# Patient Record
Sex: FEMALE | Race: WHITE | NOT HISPANIC OR LATINO | Employment: FULL TIME | URBAN - METROPOLITAN AREA
[De-identification: names, ages, dates, MRNs, and addresses within clinical notes are randomized per-mention and may not be internally consistent; named-entity substitution may affect disease eponyms.]

---

## 2021-07-14 ENCOUNTER — TRANSFERRED RECORDS (OUTPATIENT)
Dept: HEALTH INFORMATION MANAGEMENT | Facility: CLINIC | Age: 36
End: 2021-07-14
Payer: COMMERCIAL

## 2021-07-14 LAB
HPV ABSTRACT: NORMAL
PAP-ABSTRACT: ABNORMAL
PHQ9 SCORE: 0

## 2021-12-01 LAB
ABO (EXTERNAL): NORMAL
C TRACH DNA SPEC QL PROBE+SIG AMP: NEGATIVE
HEMATOCRIT (EXTERNAL): 37.4 % (ref 33–51)
HEMOGLOBIN (EXTERNAL): 12.9 G/DL (ref 12–16)
HEPATITIS B SURFACE ANTIGEN (EXTERNAL): NONREACTIVE
HEPATITIS C ANTIBODY (EXTERNAL): NEGATIVE
HIV1+2 AB SERPL QL IA: NONREACTIVE
N GONORRHOEA DNA SPEC QL PROBE+SIG AMP: NEGATIVE
PLATELET COUNT (EXTERNAL): 280 10E3/UL (ref 140–440)
RH (EXTERNAL): NEGATIVE
RUBELLA ANTIBODY IGG (EXTERNAL): NORMAL
SPECIMEN DESCRIP: NORMAL
SPECIMEN DESCRIPTION: NORMAL
TREPONEMA PALLIDUM ANTIBODY (EXTERNAL): NEGATIVE
URINE CULTURE OB NURSE INTERPRETATION (EXTERNAL RESULT): NEGATIVE
VDRL (SYPHILIS) (EXTERNAL): NEGATIVE

## 2021-12-13 ENCOUNTER — TELEPHONE (OUTPATIENT)
Dept: FAMILY MEDICINE | Facility: CLINIC | Age: 36
End: 2021-12-13

## 2021-12-13 NOTE — TELEPHONE ENCOUNTER
Reason for Call:  Other ob transfer    Detailed comments: Pt is having a change in insurance and has to transfer care. Would like to see Dr. Alegria.  Is currently 13wks, has one more appt at [facility] with Yvonne Lane on 12/30/21    Phone Number Patient can be reached at: Cell number on file:    Telephone Information:   Mobile [phone]       Best Time: anytime    Can we leave a detailed message on this number? YES    Call taken on 12/13/2021 at 11:49 AM by Sandor Murdock

## 2021-12-31 ENCOUNTER — TRANSFERRED RECORDS (OUTPATIENT)
Dept: HEALTH INFORMATION MANAGEMENT | Facility: CLINIC | Age: 36
End: 2021-12-31
Payer: COMMERCIAL

## 2022-01-25 ENCOUNTER — PRENATAL OFFICE VISIT (OUTPATIENT)
Dept: FAMILY MEDICINE | Facility: CLINIC | Age: 37
End: 2022-01-25
Payer: COMMERCIAL

## 2022-01-25 DIAGNOSIS — O09.92 HIGH-RISK PREGNANCY IN SECOND TRIMESTER: Primary | ICD-10-CM

## 2022-01-25 PROBLEM — R87.610 ASCUS OF CERVIX WITH NEGATIVE HIGH RISK HPV: Status: ACTIVE | Noted: 2021-07-14

## 2022-01-25 PROCEDURE — 99203 OFFICE O/P NEW LOW 30 MIN: CPT | Performed by: FAMILY MEDICINE

## 2022-01-25 PROCEDURE — 99207 PR PRENATAL VISIT: CPT | Performed by: FAMILY MEDICINE

## 2022-01-25 RX ORDER — PRENATAL WITH FERROUS FUM AND FOLIC ACID 3080; 920; 120; 400; 22; 1.84; 3; 20; 10; 1; 12; 200; 27; 25; 2 [IU]/1; [IU]/1; MG/1; [IU]/1; MG/1; MG/1; MG/1; MG/1; MG/1; MG/1; UG/1; MG/1; MG/1; MG/1; MG/1
1 TABLET ORAL DAILY
COMMUNITY
Start: 2022-01-09 | End: 2022-08-03

## 2022-01-25 ASSESSMENT — MIFFLIN-ST. JEOR: SCORE: 1688.68
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
## 2022-01-27 VITALS
OXYGEN SATURATION: 97 % | HEART RATE: 66 BPM | DIASTOLIC BLOOD PRESSURE: 58 MMHG | SYSTOLIC BLOOD PRESSURE: 106 MMHG | WEIGHT: 211 LBS | HEIGHT: 67 IN | BODY MASS INDEX: 33.12 KG/M2

## 2022-02-01 ENCOUNTER — HOSPITAL ENCOUNTER (OUTPATIENT)
Dept: ULTRASOUND IMAGING | Facility: CLINIC | Age: 37
Discharge: HOME | End: 2022-02-01
Attending: FAMILY MEDICINE | Admitting: FAMILY MEDICINE
Payer: COMMERCIAL

## 2022-02-01 DIAGNOSIS — O09.92 HIGH-RISK PREGNANCY IN SECOND TRIMESTER: ICD-10-CM

## 2022-02-01 PROCEDURE — 76805 OB US >/= 14 WKS SNGL FETUS: CPT

## 2022-02-06 ENCOUNTER — HEALTH MAINTENANCE LETTER (OUTPATIENT)
Age: 37
End: 2022-02-06

## 2022-03-02 ENCOUNTER — PRENATAL OFFICE VISIT (OUTPATIENT)
Dept: FAMILY MEDICINE | Facility: CLINIC | Age: 37
End: 2022-03-02
Payer: COMMERCIAL

## 2022-03-02 VITALS
WEIGHT: 212 LBS | DIASTOLIC BLOOD PRESSURE: 62 MMHG | BODY MASS INDEX: 32.96 KG/M2 | OXYGEN SATURATION: 98 % | SYSTOLIC BLOOD PRESSURE: 110 MMHG | HEART RATE: 84 BPM

## 2022-03-02 DIAGNOSIS — L70.9 ACNE, UNSPECIFIED ACNE TYPE: Primary | ICD-10-CM

## 2022-03-02 PROCEDURE — 99207 PR PRENATAL VISIT: CPT | Performed by: FAMILY MEDICINE

## 2022-03-02 RX ORDER — CLINDAMYCIN PHOSPHATE 11.9 MG/ML
SOLUTION TOPICAL 2 TIMES DAILY
Qty: 30 ML | Refills: 3 | Status: SHIPPED | OUTPATIENT
Start: 2022-03-02

## 2022-03-02 ASSESSMENT — PATIENT HEALTH QUESTIONNAIRE - PHQ9
SUM OF ALL RESPONSES TO PHQ QUESTIONS 1-9: 0
SUM OF ALL RESPONSES TO PHQ QUESTIONS 1-9: 0
10. IF YOU CHECKED OFF ANY PROBLEMS, HOW DIFFICULT HAVE THESE PROBLEMS MADE IT FOR YOU TO DO YOUR WORK, TAKE CARE OF THINGS AT HOME, OR GET ALONG WITH OTHER PEOPLE: NOT DIFFICULT AT ALL

## 2022-03-02 NOTE — PROGRESS NOTES
Doing well.  No concerns today.  Prenatal flowsheet information is reviewed.  Discussed kick counts and fetal movement.  Reportable signs and symptoms discussed.    She has had some mild swelling, has been wearing compression socks which is helping. Baby is moving well as well.   Plan for one hour GTT, labs, tdap, rhogam in four weeks.       Answers for HPI/ROS submitted by the patient on 3/2/2022  If you checked off any problems, how difficult have these problems made it for you to do your work, take care of things at home, or get along with other people?: Not difficult at all  PHQ9 TOTAL SCORE: 0

## 2022-03-03 ASSESSMENT — PATIENT HEALTH QUESTIONNAIRE - PHQ9: SUM OF ALL RESPONSES TO PHQ QUESTIONS 1-9: 0

## 2022-03-30 ENCOUNTER — PRENATAL OFFICE VISIT (OUTPATIENT)
Dept: FAMILY MEDICINE | Facility: CLINIC | Age: 37
End: 2022-03-30
Payer: COMMERCIAL

## 2022-03-30 VITALS
OXYGEN SATURATION: 99 % | WEIGHT: 215 LBS | BODY MASS INDEX: 33.42 KG/M2 | DIASTOLIC BLOOD PRESSURE: 58 MMHG | HEART RATE: 78 BPM | SYSTOLIC BLOOD PRESSURE: 102 MMHG

## 2022-03-30 DIAGNOSIS — Z23 IMMUNIZATION DUE: ICD-10-CM

## 2022-03-30 DIAGNOSIS — Z67.91 RH NEGATIVE STATUS DURING PREGNANCY IN THIRD TRIMESTER: ICD-10-CM

## 2022-03-30 DIAGNOSIS — O09.93 HIGH-RISK PREGNANCY IN THIRD TRIMESTER: Primary | ICD-10-CM

## 2022-03-30 DIAGNOSIS — O26.893 RH NEGATIVE STATUS DURING PREGNANCY IN THIRD TRIMESTER: ICD-10-CM

## 2022-03-30 LAB
BASOPHILS # BLD AUTO: 0 10E3/UL (ref 0–0.2)
BASOPHILS NFR BLD AUTO: 0 %
EOSINOPHIL # BLD AUTO: 0.1 10E3/UL (ref 0–0.7)
EOSINOPHIL NFR BLD AUTO: 1 %
ERYTHROCYTE [DISTWIDTH] IN BLOOD BY AUTOMATED COUNT: 13.4 % (ref 10–15)
GLUCOSE 1H P 50 G GLC PO SERPL-MCNC: 132 MG/DL (ref 70–129)
HCT VFR BLD AUTO: 33.5 % (ref 35–47)
HGB BLD-MCNC: 11.5 G/DL (ref 11.7–15.7)
IMM GRANULOCYTES # BLD: 0.1 10E3/UL
IMM GRANULOCYTES NFR BLD: 1 %
LYMPHOCYTES # BLD AUTO: 2.3 10E3/UL (ref 0.8–5.3)
LYMPHOCYTES NFR BLD AUTO: 22 %
MCH RBC QN AUTO: 30.3 PG (ref 26.5–33)
MCHC RBC AUTO-ENTMCNC: 34.3 G/DL (ref 31.5–36.5)
MCV RBC AUTO: 88 FL (ref 78–100)
MONOCYTES # BLD AUTO: 0.6 10E3/UL (ref 0–1.3)
MONOCYTES NFR BLD AUTO: 6 %
NEUTROPHILS # BLD AUTO: 7.3 10E3/UL (ref 1.6–8.3)
NEUTROPHILS NFR BLD AUTO: 71 %
PLATELET # BLD AUTO: 233 10E3/UL (ref 150–450)
RBC # BLD AUTO: 3.8 10E6/UL (ref 3.8–5.2)
WBC # BLD AUTO: 10.3 10E3/UL (ref 4–11)

## 2022-03-30 PROCEDURE — 82950 GLUCOSE TEST: CPT | Performed by: FAMILY MEDICINE

## 2022-03-30 PROCEDURE — 86850 RBC ANTIBODY SCREEN: CPT | Performed by: FAMILY MEDICINE

## 2022-03-30 PROCEDURE — 86780 TREPONEMA PALLIDUM: CPT | Performed by: FAMILY MEDICINE

## 2022-03-30 PROCEDURE — 96372 THER/PROPH/DIAG INJ SC/IM: CPT | Performed by: FAMILY MEDICINE

## 2022-03-30 PROCEDURE — 90715 TDAP VACCINE 7 YRS/> IM: CPT | Performed by: FAMILY MEDICINE

## 2022-03-30 PROCEDURE — 90471 IMMUNIZATION ADMIN: CPT | Performed by: FAMILY MEDICINE

## 2022-03-30 PROCEDURE — 36415 COLL VENOUS BLD VENIPUNCTURE: CPT | Performed by: FAMILY MEDICINE

## 2022-03-30 PROCEDURE — 85025 COMPLETE CBC W/AUTO DIFF WBC: CPT | Performed by: FAMILY MEDICINE

## 2022-03-30 PROCEDURE — 99207 PR PRENATAL VISIT: CPT | Performed by: FAMILY MEDICINE

## 2022-03-30 NOTE — PROGRESS NOTES
Concerns: None. Baby is moving well.   No vaginal bleeding, loss of fluid, or contractions  1 hour GTT done today.  Rhogam given today.  Discussed kick counts and fetal movement.  Tdap given today  Discussed kick counts and fetal movement.  Discussed PTL, PROM, and when to call or come in.  RTC in 4 weeks.    She has had no headaches, blurry vision, ruq pain or swelling. No other concerns.       Yvonne Alegria MD

## 2022-03-31 DIAGNOSIS — O09.93 HIGH-RISK PREGNANCY IN THIRD TRIMESTER: Primary | ICD-10-CM

## 2022-03-31 LAB
ANTIBODY SCREEN, TUBE: NORMAL
SPECIMEN EXPIRATION DATE: NORMAL
T PALLIDUM AB SER QL: NONREACTIVE

## 2022-04-05 ENCOUNTER — LAB (OUTPATIENT)
Dept: LAB | Facility: CLINIC | Age: 37
End: 2022-04-05
Payer: COMMERCIAL

## 2022-04-05 DIAGNOSIS — O09.93 HIGH-RISK PREGNANCY IN THIRD TRIMESTER: ICD-10-CM

## 2022-04-05 LAB
GESTATIONAL GTT 1 HR POST DOSE: 159 MG/DL (ref 60–179)
GESTATIONAL GTT 2 HR POST DOSE: 149 MG/DL (ref 60–154)
GESTATIONAL GTT 3 HR POST DOSE: 87 MG/DL (ref 60–139)
GLUCOSE P FAST SERPL-MCNC: 70 MG/DL (ref 60–94)

## 2022-04-05 PROCEDURE — 82951 GLUCOSE TOLERANCE TEST (GTT): CPT

## 2022-04-05 PROCEDURE — 82952 GTT-ADDED SAMPLES: CPT

## 2022-04-05 PROCEDURE — 36415 COLL VENOUS BLD VENIPUNCTURE: CPT

## 2022-05-10 ENCOUNTER — PRENATAL OFFICE VISIT (OUTPATIENT)
Dept: FAMILY MEDICINE | Facility: CLINIC | Age: 37
End: 2022-05-10
Payer: COMMERCIAL

## 2022-05-10 VITALS
DIASTOLIC BLOOD PRESSURE: 58 MMHG | HEART RATE: 73 BPM | WEIGHT: 223 LBS | SYSTOLIC BLOOD PRESSURE: 100 MMHG | BODY MASS INDEX: 34.67 KG/M2 | OXYGEN SATURATION: 98 %

## 2022-05-10 DIAGNOSIS — N89.8 VAGINAL DISCHARGE: Primary | ICD-10-CM

## 2022-05-10 LAB
CLUE CELLS: ABNORMAL
TRICHOMONAS, WET PREP: ABNORMAL
WBC'S/HIGH POWER FIELD, WET PREP: ABNORMAL
YEAST, WET PREP: PRESENT

## 2022-05-10 PROCEDURE — 87210 SMEAR WET MOUNT SALINE/INK: CPT | Performed by: FAMILY MEDICINE

## 2022-05-10 PROCEDURE — 99207 PR PRENATAL VISIT: CPT | Performed by: FAMILY MEDICINE

## 2022-05-10 RX ORDER — MICONAZOLE NITRATE 2 %
1 CREAM WITH APPLICATOR VAGINAL AT BEDTIME
Qty: 45 G | Refills: 0 | Status: SHIPPED | OUTPATIENT
Start: 2022-05-10 | End: 2022-05-17

## 2022-05-23 ENCOUNTER — PRENATAL OFFICE VISIT (OUTPATIENT)
Dept: FAMILY MEDICINE | Facility: CLINIC | Age: 37
End: 2022-05-23
Payer: COMMERCIAL

## 2022-05-23 VITALS
WEIGHT: 223 LBS | DIASTOLIC BLOOD PRESSURE: 60 MMHG | OXYGEN SATURATION: 97 % | SYSTOLIC BLOOD PRESSURE: 96 MMHG | BODY MASS INDEX: 34.67 KG/M2 | HEART RATE: 83 BPM

## 2022-05-23 DIAGNOSIS — O09.93 HIGH-RISK PREGNANCY IN THIRD TRIMESTER: Primary | ICD-10-CM

## 2022-05-23 LAB
ALBUMIN UR-MCNC: NEGATIVE MG/DL
APPEARANCE UR: CLEAR
BILIRUB UR QL STRIP: NEGATIVE
COLOR UR AUTO: YELLOW
GLUCOSE UR STRIP-MCNC: NEGATIVE MG/DL
HGB UR QL STRIP: NEGATIVE
KETONES UR STRIP-MCNC: NEGATIVE MG/DL
LEUKOCYTE ESTERASE UR QL STRIP: NEGATIVE
NITRATE UR QL: NEGATIVE
PH UR STRIP: 7 [PH] (ref 5–8)
SP GR UR STRIP: 1.02 (ref 1–1.03)
UROBILINOGEN UR STRIP-ACNC: 1 E.U./DL

## 2022-05-23 PROCEDURE — 99207 PR PRENATAL VISIT: CPT | Performed by: FAMILY MEDICINE

## 2022-05-23 PROCEDURE — 81003 URINALYSIS AUTO W/O SCOPE: CPT | Performed by: FAMILY MEDICINE

## 2022-05-23 PROCEDURE — 87653 STREP B DNA AMP PROBE: CPT | Performed by: FAMILY MEDICINE

## 2022-05-23 NOTE — PROGRESS NOTES
Doing well.  No concerns today.  Cervix is posterior, long, closed and soft.  GBS done today.  Prenatal flowsheet information is reviewed.  Reportable signs and symptoms discussed.    She is doing well generally. She does note some swelling in her hands and feet, did have to take her wedding ring off.     Doing well generally, f/u in one week.

## 2022-05-25 LAB — GP B STREP DNA SPEC QL NAA+PROBE: POSITIVE

## 2022-05-31 ENCOUNTER — PRENATAL OFFICE VISIT (OUTPATIENT)
Dept: FAMILY MEDICINE | Facility: CLINIC | Age: 37
End: 2022-05-31
Payer: COMMERCIAL

## 2022-05-31 VITALS
OXYGEN SATURATION: 98 % | BODY MASS INDEX: 34.98 KG/M2 | DIASTOLIC BLOOD PRESSURE: 72 MMHG | WEIGHT: 225 LBS | SYSTOLIC BLOOD PRESSURE: 110 MMHG | HEART RATE: 78 BPM

## 2022-05-31 DIAGNOSIS — O09.93 HIGH-RISK PREGNANCY IN THIRD TRIMESTER: Primary | ICD-10-CM

## 2022-05-31 LAB
ALBUMIN UR-MCNC: NEGATIVE MG/DL
APPEARANCE UR: CLEAR
BILIRUB UR QL STRIP: NEGATIVE
COLOR UR AUTO: YELLOW
GLUCOSE UR STRIP-MCNC: NEGATIVE MG/DL
HGB UR QL STRIP: NEGATIVE
KETONES UR STRIP-MCNC: NEGATIVE MG/DL
LEUKOCYTE ESTERASE UR QL STRIP: ABNORMAL
NITRATE UR QL: NEGATIVE
PH UR STRIP: 6 [PH] (ref 5–8)
SP GR UR STRIP: 1.01 (ref 1–1.03)
UROBILINOGEN UR STRIP-ACNC: 1 E.U./DL

## 2022-05-31 PROCEDURE — 81003 URINALYSIS AUTO W/O SCOPE: CPT | Performed by: FAMILY MEDICINE

## 2022-05-31 PROCEDURE — 99207 PR PRENATAL VISIT: CPT | Performed by: FAMILY MEDICINE

## 2022-05-31 NOTE — PROGRESS NOTES
Doing well.  No concerns today.  Cervix is posterior, tunnelled, 1.5 to dimple, 25%, soft.  Prenatal flowsheet information is reviewed.  Discussed signs of labor and when to call or come in.  Reportable signs and symptoms discussed.    She is feeling pretty well. Has had some contractions, cramping. She does have some discharge as well. Baby is moving well. She has had some mild swelling in her ankles has had no h/a, blurry vision, ruq pain.

## 2022-06-07 ENCOUNTER — PRENATAL OFFICE VISIT (OUTPATIENT)
Dept: FAMILY MEDICINE | Facility: CLINIC | Age: 37
End: 2022-06-07
Payer: COMMERCIAL

## 2022-06-07 VITALS
SYSTOLIC BLOOD PRESSURE: 110 MMHG | OXYGEN SATURATION: 98 % | DIASTOLIC BLOOD PRESSURE: 60 MMHG | HEART RATE: 76 BPM | WEIGHT: 226 LBS | BODY MASS INDEX: 35.13 KG/M2

## 2022-06-07 DIAGNOSIS — O09.93 HIGH-RISK PREGNANCY IN THIRD TRIMESTER: Primary | ICD-10-CM

## 2022-06-07 LAB
ALBUMIN UR-MCNC: NEGATIVE MG/DL
APPEARANCE UR: CLEAR
BILIRUB UR QL STRIP: NEGATIVE
COLOR UR AUTO: YELLOW
GLUCOSE UR STRIP-MCNC: NEGATIVE MG/DL
HGB UR QL STRIP: NEGATIVE
KETONES UR STRIP-MCNC: 40 MG/DL
LEUKOCYTE ESTERASE UR QL STRIP: ABNORMAL
NITRATE UR QL: NEGATIVE
PH UR STRIP: 6.5 [PH] (ref 5–8)
SP GR UR STRIP: 1.01 (ref 1–1.03)
UROBILINOGEN UR STRIP-ACNC: 0.2 E.U./DL

## 2022-06-07 PROCEDURE — 99207 PR PRENATAL VISIT: CPT | Performed by: FAMILY MEDICINE

## 2022-06-07 PROCEDURE — 81003 URINALYSIS AUTO W/O SCOPE: CPT | Performed by: FAMILY MEDICINE

## 2022-06-07 NOTE — PROGRESS NOTES
Doing well.  No concerns today.  Cervix is not favorable for induction and essentially unchanged from previous exam although more externally dilated, is still technically closed.   Prenatal flowsheet information is reviewed.  Discussed indications, risks, complications and failure rate of inductions.  Discussed signs of labor and when to call or come in.  Reportable signs and symptoms discussed.    She did have a root canal yesterday and is doing better now. She did have a lot of pain prior to the root canal, is doing a bit better now. She did have some contractions over the weekend but she is doing better now.     She has had no headaches, blurry vision, ruq pain. Some mild swelling.     F/u next week, call sooner if issues.

## 2022-06-14 ENCOUNTER — PRENATAL OFFICE VISIT (OUTPATIENT)
Dept: FAMILY MEDICINE | Facility: CLINIC | Age: 37
End: 2022-06-14
Payer: COMMERCIAL

## 2022-06-14 VITALS
WEIGHT: 226 LBS | BODY MASS INDEX: 35.13 KG/M2 | RESPIRATION RATE: 17 BRPM | OXYGEN SATURATION: 98 % | SYSTOLIC BLOOD PRESSURE: 100 MMHG | HEART RATE: 85 BPM | DIASTOLIC BLOOD PRESSURE: 60 MMHG

## 2022-06-14 DIAGNOSIS — O09.93 HIGH-RISK PREGNANCY IN THIRD TRIMESTER: Primary | ICD-10-CM

## 2022-06-14 LAB
ALBUMIN UR-MCNC: NEGATIVE MG/DL
APPEARANCE UR: CLEAR
BILIRUB UR QL STRIP: NEGATIVE
COLOR UR AUTO: YELLOW
GLUCOSE UR STRIP-MCNC: NEGATIVE MG/DL
HGB UR QL STRIP: NEGATIVE
KETONES UR STRIP-MCNC: NEGATIVE MG/DL
LEUKOCYTE ESTERASE UR QL STRIP: ABNORMAL
NITRATE UR QL: NEGATIVE
PH UR STRIP: 7 [PH] (ref 5–8)
SP GR UR STRIP: 1.01 (ref 1–1.03)
UROBILINOGEN UR STRIP-ACNC: 0.2 E.U./DL

## 2022-06-14 PROCEDURE — 81003 URINALYSIS AUTO W/O SCOPE: CPT | Performed by: FAMILY MEDICINE

## 2022-06-14 PROCEDURE — 99207 PR PRENATAL VISIT: CPT | Performed by: FAMILY MEDICINE

## 2022-06-14 NOTE — PROGRESS NOTES
Doing well.  No concerns today.  Cervix is 1cm, soft, 25%, posterior.  Prenatal flowsheet information is reviewed.  Discussed indications, risks, complications and failure rate of inductions.  Discussed signs of labor and when to call or come in.  Reportable signs and symptoms discussed.    She has been having more contractions in the last few days especially two days ago and yesterday. She has had no blurry vision, ruq pain, swelling, ruq pain. Baby is moving well. She does at times feel baby quite low, and sometimes baby is higher up.     She will f/u next week if doing well, otherwise hopefully will deliver this week.

## 2022-06-20 ENCOUNTER — PRENATAL OFFICE VISIT (OUTPATIENT)
Dept: FAMILY MEDICINE | Facility: CLINIC | Age: 37
End: 2022-06-20
Payer: COMMERCIAL

## 2022-06-20 VITALS
OXYGEN SATURATION: 98 % | HEART RATE: 86 BPM | DIASTOLIC BLOOD PRESSURE: 68 MMHG | HEIGHT: 67 IN | WEIGHT: 226 LBS | SYSTOLIC BLOOD PRESSURE: 115 MMHG | BODY MASS INDEX: 35.47 KG/M2

## 2022-06-20 DIAGNOSIS — Z34.83 ENCOUNTER FOR SUPERVISION OF OTHER NORMAL PREGNANCY IN THIRD TRIMESTER: Primary | ICD-10-CM

## 2022-06-20 PROCEDURE — 99207 PR PRENATAL VISIT: CPT | Performed by: FAMILY MEDICINE

## 2022-06-24 ENCOUNTER — PRENATAL OFFICE VISIT (OUTPATIENT)
Dept: FAMILY MEDICINE | Facility: CLINIC | Age: 37
End: 2022-06-24
Payer: COMMERCIAL

## 2022-06-24 ENCOUNTER — MYC MEDICAL ADVICE (OUTPATIENT)
Dept: FAMILY MEDICINE | Facility: CLINIC | Age: 37
End: 2022-06-24

## 2022-06-24 VITALS
RESPIRATION RATE: 16 BRPM | SYSTOLIC BLOOD PRESSURE: 110 MMHG | OXYGEN SATURATION: 98 % | DIASTOLIC BLOOD PRESSURE: 58 MMHG | WEIGHT: 227 LBS | BODY MASS INDEX: 35.29 KG/M2 | HEART RATE: 80 BPM

## 2022-06-24 DIAGNOSIS — Z34.83 ENCOUNTER FOR SUPERVISION OF OTHER NORMAL PREGNANCY IN THIRD TRIMESTER: Primary | ICD-10-CM

## 2022-06-24 LAB
ALBUMIN UR-MCNC: NEGATIVE MG/DL
APPEARANCE UR: CLEAR
BILIRUB UR QL STRIP: NEGATIVE
COLOR UR AUTO: YELLOW
GLUCOSE UR STRIP-MCNC: NEGATIVE MG/DL
HGB UR QL STRIP: NEGATIVE
KETONES UR STRIP-MCNC: 15 MG/DL
LEUKOCYTE ESTERASE UR QL STRIP: NEGATIVE
NITRATE UR QL: NEGATIVE
PH UR STRIP: 7 [PH] (ref 5–8)
SP GR UR STRIP: 1.01 (ref 1–1.03)
UROBILINOGEN UR STRIP-ACNC: 0.2 E.U./DL

## 2022-06-24 PROCEDURE — 81003 URINALYSIS AUTO W/O SCOPE: CPT | Performed by: FAMILY MEDICINE

## 2022-06-24 PROCEDURE — 59426 ANTEPARTUM CARE ONLY: CPT | Performed by: FAMILY MEDICINE

## 2022-06-24 NOTE — PROGRESS NOTES
Doing well.  No concerns today.  Cervix is unchanged.  Cephalic position confirmed by Leopold maneuvers.  Prenatal flowsheet information is reviewed.  Discussed indications, risks, complications and failure rate of inductions.  Discussed signs of labor and when to call or come in.  Reportable signs and symptoms discussed.    She is feeling generally ok at this time. Will contract daily from 12-6 and then things peter out. Has had some pink tinged discharge as well. Baby is moving well. She is feeling generally ok.     Given her being past her due date, patient and  would like to set up IOL at 41 weeks if she doesn't go into labor prior to this. I do think that this is appropriate. IOL Set up for 6/28 if she doesn't go into labor prior to this.

## 2022-06-26 NOTE — PROGRESS NOTES
Patient is overall doing okay.  She did have lots of contractions last week on them on and off on Sunday Monday and then Wednesday and then Thursday did not have anything at all.  On Friday she had a few again but then they seem to stop before the weekend.  She is feeling lots of baby movement.  She denies that she is having any bleeding.  She is anxious to be done with this pregnancy.  She continues to have quite a bit of swelling in her hands and her feet but overall it seems like it is relatively stable.  We did discuss fact that she is positive for group B strep so she wants to come to the hospital sooner rather than later just so that we can get that treatment started.  She does have a breast pump already.  She does plan to breast-feed.  Cervix today is 1 cm dilated but very posterior and thick.  All of her and her 's questions were answered today.  She overall feels like things are doing okay.  She does have an appointment at the end of the week with Dr. Alegria her primary doctor and we will set up induction of labor at that time.  If she has additional questions or concerns prior to that should certainly let me know.

## 2022-06-28 ENCOUNTER — HOSPITAL ENCOUNTER (INPATIENT)
Facility: CLINIC | Age: 37
LOS: 2 days | Discharge: HOME | End: 2022-06-30
Attending: FAMILY MEDICINE | Admitting: FAMILY MEDICINE
Payer: COMMERCIAL

## 2022-06-28 ENCOUNTER — NURSE TRIAGE (OUTPATIENT)
Dept: NURSING | Facility: CLINIC | Age: 37
End: 2022-06-28

## 2022-06-28 PROBLEM — O48.0 POST-DATES PREGNANCY: Status: ACTIVE | Noted: 2022-06-28

## 2022-06-28 LAB
ABO/RH(D): NORMAL
ANTIBODY SCREEN: NEGATIVE
SARS-COV-2 RNA RESP QL NAA+PROBE: NEGATIVE
SPECIMEN EXPIRATION DATE: NORMAL
T PALLIDUM AB SER QL: NONREACTIVE

## 2022-06-28 PROCEDURE — U0005 INFEC AGEN DETEC AMPLI PROBE: HCPCS | Performed by: FAMILY MEDICINE

## 2022-06-28 PROCEDURE — 59410 OBSTETRICAL CARE: CPT | Performed by: FAMILY MEDICINE

## 2022-06-28 PROCEDURE — 250N000013 HC RX MED GY IP 250 OP 250 PS 637: Performed by: FAMILY MEDICINE

## 2022-06-28 PROCEDURE — 86850 RBC ANTIBODY SCREEN: CPT | Performed by: FAMILY MEDICINE

## 2022-06-28 PROCEDURE — 0UQMXZZ REPAIR VULVA, EXTERNAL APPROACH: ICD-10-PCS | Performed by: FAMILY MEDICINE

## 2022-06-28 PROCEDURE — 250N000009 HC RX 250: Performed by: FAMILY MEDICINE

## 2022-06-28 PROCEDURE — 3E033VJ INTRODUCTION OF OTHER HORMONE INTO PERIPHERAL VEIN, PERCUTANEOUS APPROACH: ICD-10-PCS | Performed by: FAMILY MEDICINE

## 2022-06-28 PROCEDURE — 86780 TREPONEMA PALLIDUM: CPT | Performed by: FAMILY MEDICINE

## 2022-06-28 PROCEDURE — 120N000001 HC R&B MED SURG/OB

## 2022-06-28 PROCEDURE — 36415 COLL VENOUS BLD VENIPUNCTURE: CPT | Performed by: FAMILY MEDICINE

## 2022-06-28 PROCEDURE — 722N000001 HC LABOR CARE VAGINAL DELIVERY SINGLE

## 2022-06-28 PROCEDURE — 250N000011 HC RX IP 250 OP 636: Performed by: FAMILY MEDICINE

## 2022-06-28 RX ORDER — ONDANSETRON 2 MG/ML
4 INJECTION INTRAMUSCULAR; INTRAVENOUS EVERY 6 HOURS PRN
Status: DISCONTINUED | OUTPATIENT
Start: 2022-06-28 | End: 2022-06-28 | Stop reason: HOSPADM

## 2022-06-28 RX ORDER — DOCUSATE SODIUM 100 MG/1
100 CAPSULE, LIQUID FILLED ORAL DAILY
Status: DISCONTINUED | OUTPATIENT
Start: 2022-06-29 | End: 2022-06-30 | Stop reason: HOSPADM

## 2022-06-28 RX ORDER — KETOROLAC TROMETHAMINE 30 MG/ML
30 INJECTION, SOLUTION INTRAMUSCULAR; INTRAVENOUS
Status: DISCONTINUED | OUTPATIENT
Start: 2022-06-28 | End: 2022-06-30 | Stop reason: HOSPADM

## 2022-06-28 RX ORDER — MODIFIED LANOLIN
OINTMENT (GRAM) TOPICAL
Status: DISCONTINUED | OUTPATIENT
Start: 2022-06-28 | End: 2022-06-30 | Stop reason: HOSPADM

## 2022-06-28 RX ORDER — METHYLERGONOVINE MALEATE 0.2 MG/ML
200 INJECTION INTRAVENOUS
Status: DISCONTINUED | OUTPATIENT
Start: 2022-06-28 | End: 2022-06-28 | Stop reason: HOSPADM

## 2022-06-28 RX ORDER — PENICILLIN G 3000000 [IU]/50ML
3 INJECTION, SOLUTION INTRAVENOUS EVERY 4 HOURS
Status: DISCONTINUED | OUTPATIENT
Start: 2022-06-28 | End: 2022-06-28 | Stop reason: HOSPADM

## 2022-06-28 RX ORDER — OXYTOCIN/0.9 % SODIUM CHLORIDE 30/500 ML
100-340 PLASTIC BAG, INJECTION (ML) INTRAVENOUS CONTINUOUS PRN
Status: DISCONTINUED | OUTPATIENT
Start: 2022-06-28 | End: 2022-06-30 | Stop reason: HOSPADM

## 2022-06-28 RX ORDER — MISOPROSTOL 200 UG/1
800 TABLET ORAL
Status: DISCONTINUED | OUTPATIENT
Start: 2022-06-28 | End: 2022-06-30 | Stop reason: HOSPADM

## 2022-06-28 RX ORDER — MISOPROSTOL 200 UG/1
400 TABLET ORAL
Status: DISCONTINUED | OUTPATIENT
Start: 2022-06-28 | End: 2022-06-28 | Stop reason: HOSPADM

## 2022-06-28 RX ORDER — TERBUTALINE SULFATE 1 MG/ML
0.25 INJECTION, SOLUTION SUBCUTANEOUS
Status: DISCONTINUED | OUTPATIENT
Start: 2022-06-28 | End: 2022-06-28 | Stop reason: HOSPADM

## 2022-06-28 RX ORDER — CITRIC ACID/SODIUM CITRATE 334-500MG
30 SOLUTION, ORAL ORAL
Status: DISCONTINUED | OUTPATIENT
Start: 2022-06-28 | End: 2022-06-28 | Stop reason: HOSPADM

## 2022-06-28 RX ORDER — MISOPROSTOL 100 UG/1
25 TABLET ORAL
Status: DISCONTINUED | OUTPATIENT
Start: 2022-06-28 | End: 2022-06-28 | Stop reason: HOSPADM

## 2022-06-28 RX ORDER — ONDANSETRON 4 MG/1
4 TABLET, ORALLY DISINTEGRATING ORAL EVERY 6 HOURS PRN
Status: DISCONTINUED | OUTPATIENT
Start: 2022-06-28 | End: 2022-06-28 | Stop reason: HOSPADM

## 2022-06-28 RX ORDER — METHYLERGONOVINE MALEATE 0.2 MG/ML
200 INJECTION INTRAVENOUS
Status: DISCONTINUED | OUTPATIENT
Start: 2022-06-28 | End: 2022-06-30 | Stop reason: HOSPADM

## 2022-06-28 RX ORDER — OXYTOCIN/0.9 % SODIUM CHLORIDE 30/500 ML
340 PLASTIC BAG, INJECTION (ML) INTRAVENOUS CONTINUOUS PRN
Status: DISCONTINUED | OUTPATIENT
Start: 2022-06-28 | End: 2022-06-28 | Stop reason: HOSPADM

## 2022-06-28 RX ORDER — NALOXONE HYDROCHLORIDE 0.4 MG/ML
0.2 INJECTION, SOLUTION INTRAMUSCULAR; INTRAVENOUS; SUBCUTANEOUS
Status: DISCONTINUED | OUTPATIENT
Start: 2022-06-28 | End: 2022-06-28 | Stop reason: HOSPADM

## 2022-06-28 RX ORDER — OXYTOCIN 10 [USP'U]/ML
10 INJECTION, SOLUTION INTRAMUSCULAR; INTRAVENOUS
Status: DISCONTINUED | OUTPATIENT
Start: 2022-06-28 | End: 2022-06-30 | Stop reason: HOSPADM

## 2022-06-28 RX ORDER — CARBOPROST TROMETHAMINE 250 UG/ML
250 INJECTION, SOLUTION INTRAMUSCULAR
Status: DISCONTINUED | OUTPATIENT
Start: 2022-06-28 | End: 2022-06-30 | Stop reason: HOSPADM

## 2022-06-28 RX ORDER — SODIUM CHLORIDE, SODIUM LACTATE, POTASSIUM CHLORIDE, CALCIUM CHLORIDE 600; 310; 30; 20 MG/100ML; MG/100ML; MG/100ML; MG/100ML
INJECTION, SOLUTION INTRAVENOUS CONTINUOUS PRN
Status: DISCONTINUED | OUTPATIENT
Start: 2022-06-28 | End: 2022-06-28 | Stop reason: HOSPADM

## 2022-06-28 RX ORDER — OXYTOCIN/0.9 % SODIUM CHLORIDE 30/500 ML
1-24 PLASTIC BAG, INJECTION (ML) INTRAVENOUS CONTINUOUS
Status: DISCONTINUED | OUTPATIENT
Start: 2022-06-28 | End: 2022-06-28 | Stop reason: HOSPADM

## 2022-06-28 RX ORDER — OXYTOCIN/0.9 % SODIUM CHLORIDE 30/500 ML
340 PLASTIC BAG, INJECTION (ML) INTRAVENOUS CONTINUOUS PRN
Status: DISCONTINUED | OUTPATIENT
Start: 2022-06-28 | End: 2022-06-30 | Stop reason: HOSPADM

## 2022-06-28 RX ORDER — CARBOPROST TROMETHAMINE 250 UG/ML
250 INJECTION, SOLUTION INTRAMUSCULAR
Status: DISCONTINUED | OUTPATIENT
Start: 2022-06-28 | End: 2022-06-28 | Stop reason: HOSPADM

## 2022-06-28 RX ORDER — MISOPROSTOL 200 UG/1
400 TABLET ORAL
Status: DISCONTINUED | OUTPATIENT
Start: 2022-06-28 | End: 2022-06-30 | Stop reason: HOSPADM

## 2022-06-28 RX ORDER — BISACODYL 10 MG
10 SUPPOSITORY, RECTAL RECTAL DAILY PRN
Status: DISCONTINUED | OUTPATIENT
Start: 2022-06-28 | End: 2022-06-30 | Stop reason: HOSPADM

## 2022-06-28 RX ORDER — MISOPROSTOL 200 UG/1
800 TABLET ORAL
Status: DISCONTINUED | OUTPATIENT
Start: 2022-06-28 | End: 2022-06-28 | Stop reason: HOSPADM

## 2022-06-28 RX ORDER — PROCHLORPERAZINE MALEATE 10 MG
10 TABLET ORAL EVERY 6 HOURS PRN
Status: DISCONTINUED | OUTPATIENT
Start: 2022-06-28 | End: 2022-06-28 | Stop reason: HOSPADM

## 2022-06-28 RX ORDER — METOCLOPRAMIDE HYDROCHLORIDE 5 MG/ML
10 INJECTION INTRAMUSCULAR; INTRAVENOUS EVERY 6 HOURS PRN
Status: DISCONTINUED | OUTPATIENT
Start: 2022-06-28 | End: 2022-06-28 | Stop reason: HOSPADM

## 2022-06-28 RX ORDER — HYDROXYZINE HYDROCHLORIDE 25 MG/1
50 TABLET, FILM COATED ORAL
Status: DISCONTINUED | OUTPATIENT
Start: 2022-06-28 | End: 2022-06-28 | Stop reason: HOSPADM

## 2022-06-28 RX ORDER — IBUPROFEN 800 MG/1
800 TABLET, FILM COATED ORAL
Status: DISCONTINUED | OUTPATIENT
Start: 2022-06-28 | End: 2022-06-30 | Stop reason: HOSPADM

## 2022-06-28 RX ORDER — NALOXONE HYDROCHLORIDE 0.4 MG/ML
0.4 INJECTION, SOLUTION INTRAMUSCULAR; INTRAVENOUS; SUBCUTANEOUS
Status: DISCONTINUED | OUTPATIENT
Start: 2022-06-28 | End: 2022-06-28 | Stop reason: HOSPADM

## 2022-06-28 RX ORDER — PROCHLORPERAZINE 25 MG
25 SUPPOSITORY, RECTAL RECTAL EVERY 12 HOURS PRN
Status: DISCONTINUED | OUTPATIENT
Start: 2022-06-28 | End: 2022-06-28 | Stop reason: HOSPADM

## 2022-06-28 RX ORDER — FENTANYL CITRATE 50 UG/ML
100 INJECTION, SOLUTION INTRAMUSCULAR; INTRAVENOUS
Status: DISCONTINUED | OUTPATIENT
Start: 2022-06-28 | End: 2022-06-28 | Stop reason: HOSPADM

## 2022-06-28 RX ORDER — METOCLOPRAMIDE 10 MG/1
10 TABLET ORAL EVERY 6 HOURS PRN
Status: DISCONTINUED | OUTPATIENT
Start: 2022-06-28 | End: 2022-06-28 | Stop reason: HOSPADM

## 2022-06-28 RX ORDER — IBUPROFEN 800 MG/1
800 TABLET, FILM COATED ORAL EVERY 6 HOURS PRN
Status: DISCONTINUED | OUTPATIENT
Start: 2022-06-28 | End: 2022-06-30 | Stop reason: HOSPADM

## 2022-06-28 RX ORDER — ACETAMINOPHEN 325 MG/1
650 TABLET ORAL EVERY 4 HOURS PRN
Status: DISCONTINUED | OUTPATIENT
Start: 2022-06-28 | End: 2022-06-28 | Stop reason: HOSPADM

## 2022-06-28 RX ORDER — MORPHINE SULFATE 10 MG/ML
10 INJECTION, SOLUTION INTRAMUSCULAR; INTRAVENOUS
Status: DISCONTINUED | OUTPATIENT
Start: 2022-06-28 | End: 2022-06-28 | Stop reason: HOSPADM

## 2022-06-28 RX ORDER — HYDROCORTISONE 25 MG/G
CREAM TOPICAL 3 TIMES DAILY PRN
Status: DISCONTINUED | OUTPATIENT
Start: 2022-06-28 | End: 2022-06-30 | Stop reason: HOSPADM

## 2022-06-28 RX ORDER — OXYTOCIN 10 [USP'U]/ML
10 INJECTION, SOLUTION INTRAMUSCULAR; INTRAVENOUS
Status: DISCONTINUED | OUTPATIENT
Start: 2022-06-28 | End: 2022-06-28 | Stop reason: HOSPADM

## 2022-06-28 RX ORDER — ACETAMINOPHEN 325 MG/1
650 TABLET ORAL EVERY 4 HOURS PRN
Status: DISCONTINUED | OUTPATIENT
Start: 2022-06-28 | End: 2022-06-30 | Stop reason: HOSPADM

## 2022-06-28 RX ORDER — PENICILLIN G POTASSIUM 5000000 [IU]/1
5 INJECTION, POWDER, FOR SOLUTION INTRAMUSCULAR; INTRAVENOUS ONCE
Status: COMPLETED | OUTPATIENT
Start: 2022-06-28 | End: 2022-06-28

## 2022-06-28 RX ADMIN — PENICILLIN G 3 MILLION UNITS: 3000000 INJECTION, SOLUTION INTRAVENOUS at 16:00

## 2022-06-28 RX ADMIN — PENICILLIN G 3 MILLION UNITS: 3000000 INJECTION, SOLUTION INTRAVENOUS at 12:00

## 2022-06-28 RX ADMIN — LIDOCAINE HYDROCHLORIDE 15 ML: 10 INJECTION, SOLUTION EPIDURAL; INFILTRATION; INTRACAUDAL; PERINEURAL at 23:24

## 2022-06-28 RX ADMIN — MISOPROSTOL 25 MCG: 100 TABLET ORAL at 09:24

## 2022-06-28 RX ADMIN — PENICILLIN G POTASSIUM 5 MILLION UNITS: 5000000 POWDER, FOR SOLUTION INTRAMUSCULAR; INTRAPLEURAL; INTRATHECAL; INTRAVENOUS at 08:35

## 2022-06-28 RX ADMIN — Medication 2 MILLI-UNITS/MIN: at 14:25

## 2022-06-28 RX ADMIN — MISOPROSTOL 25 MCG: 100 TABLET ORAL at 11:37

## 2022-06-28 RX ADMIN — PENICILLIN G 3 MILLION UNITS: 3000000 INJECTION, SOLUTION INTRAVENOUS at 19:56

## 2022-06-28 ASSESSMENT — ACTIVITIES OF DAILY LIVING (ADL)
ADLS_ACUITY_SCORE: 18
ADLS_ACUITY_SCORE: 18
DRESSING/BATHING_DIFFICULTY: NO
ADLS_ACUITY_SCORE: 18
DOING_ERRANDS_INDEPENDENTLY_DIFFICULTY: NO
ADLS_ACUITY_SCORE: 18
TOILETING_ISSUES: NO
CONCENTRATING,_REMEMBERING_OR_MAKING_DECISIONS_DIFFICULTY: NO
WEAR_GLASSES_OR_BLIND: NO
FALL_HISTORY_WITHIN_LAST_SIX_MONTHS: NO
DIFFICULTY_EATING/SWALLOWING: NO
ADLS_ACUITY_SCORE: 18
CHANGE_IN_FUNCTIONAL_STATUS_SINCE_ONSET_OF_CURRENT_ILLNESS/INJURY: NO
ADLS_ACUITY_SCORE: 18
WALKING_OR_CLIMBING_STAIRS_DIFFICULTY: NO

## 2022-06-29 PROCEDURE — 99024 POSTOP FOLLOW-UP VISIT: CPT | Performed by: FAMILY MEDICINE

## 2022-06-29 PROCEDURE — 120N000001 HC R&B MED SURG/OB

## 2022-06-29 PROCEDURE — 722N000001 HC LABOR CARE VAGINAL DELIVERY SINGLE

## 2022-06-29 PROCEDURE — C9803 HOPD COVID-19 SPEC COLLECT: HCPCS

## 2022-06-29 PROCEDURE — 250N000011 HC RX IP 250 OP 636: Performed by: FAMILY MEDICINE

## 2022-06-29 RX ADMIN — KETOROLAC TROMETHAMINE 30 MG: 30 INJECTION, SOLUTION INTRAMUSCULAR at 00:48

## 2022-06-29 ASSESSMENT — ACTIVITIES OF DAILY LIVING (ADL)
ADLS_ACUITY_SCORE: 18

## 2022-06-29 NOTE — PLAN OF CARE
Problem: Plan of Care - These are the overarching goals to be used throughout the patient stay.    Goal: Plan of Care Review/Shift Note  Outcome: Ongoing, Progressing     Lolita Kwan RN

## 2022-06-29 NOTE — PLAN OF CARE
Vitally stable on room air. Minimal pain. Fundus firm, light bleeding. Breastfeeding, ambulating, showered. Bonding well with baby.

## 2022-06-29 NOTE — H&P
Called patient and left message with provider instructions on voicmail. Should patient have any questions or concerns, patient is to contact office at [phone]. [facility]    History and Physical  Obstetrics and Gynecology     Date of Admission:  2022    Assessment & Plan   Dona Negrete is a 36 year old female who presents for IOL  ASSESSMENT:   IUP @ 41w0d for induction of labor.  Indication post dates.  NST reactive.  Category  II, did have two variable decels with cervical check    PLAN:   Labor induction with Pitocin    Yvonne Alegria MD    History of Present Illness   Dona Negrete is a 36 year old female  41w0d  Estimated Date of Delivery: 22 is calculated from Patient's last menstrual period was 2021. [facility] of [facility].  Indication post dates.    PRENATAL COURSE  Prenatal course was   complicated by  Post dates as well as AMA, Rh negative  Patient Active Problem List    Diagnosis Date Noted     Post-dates pregnancy 2022     Priority: Medium     ASCUS of cervix with negative high risk HPV 2021     Priority: Medium     Formatting of this note might be different from the original.  2021 ASCUS/HPV negative. Plan: Pap/HPV due 2024           Recent Labs   Lab Test 22  0826   AS Negative     Rhogam indicated and given at 28 weeks   No lab results found.    Past Medical History    I have reviewed this patient's medical history and updated it with pertinent information if needed.   Past Medical History:   Diagnosis Date     Carrier of group B Streptococcus        Past Surgical History   I have reviewed this patient's surgical history and updated it with pertinent information if needed.  Past Surgical History:   Procedure Laterality Date     TONSILLECTOMY         Prior to Admission Medications   Prior to Admission Medications   Prescriptions Last Dose Informant Patient Reported? Taking?   Prenatal 27-1 MG TABS   Yes No   Sig: Take 1 tablet by mouth daily   clindamycin (CLEOCIN T) 1 % external solution   No No   Sig: Apply topically 2 times daily       Facility-Administered Medications: None     Allergies   No Known Allergies    Social History   I have reviewed this patient's social history and updated it with pertinent information if needed. Dona Negrete  reports that she quit smoking about 13 years ago. She has never used smokeless tobacco. She reports previous alcohol use. She reports that she does not use drugs.    Family History   I have reviewed this patient's family history and updated it with pertinent information if needed.   Family History   Problem Relation Age of Onset     Lung Cancer Mother         metastatic to brain     Hyperlipidemia Father      Melanoma Father         metastatic to liver     Brain Cancer Maternal Grandmother      Prostate Cancer Maternal Grandfather      Lung Cancer Paternal Grandfather        Immunization History   Immunizations are up to date    Physical Exam   Temp: 98.3  F (36.8  C) Temp src: Oral BP: 111/59     Resp: 16 SpO2: 96 %      Vital Signs with Ranges  Temp:  [98.1  F (36.7  C)-98.6  F (37  C)] 98.3  F (36.8  C)  Resp:  [16] 16  BP: (109-118)/(59-61) 111/59  SpO2:  [96 %-98 %] 96 %    Abdomen: gravid, single vertex fetus, non-tender  Cervical Exam: 3/ 50/ Posterior/ soft/ -3     Fetal Heart Tones: 127 baseline, moderate variablility, + accels, no decels and Category I  TOCO:   frequency q 2-3 minutes    Constitutional: healthy, alert and active

## 2022-06-29 NOTE — L&D DELIVERY NOTE
OB Vaginal Delivery Note    Dona Negrete MRN# [id number]   Age: 36 year old YOB: 1985       GA: 41w0d  GP:   Labor Complications: None   EBL: 200  mL  Delivery QBL: 200 mL  Delivery Type: Vaginal, Spontaneous   ROM to Delivery Time: (Delivered) Hours: 2 Minutes: 21   Weight:     1 Minute 5 Minute 10 Minute   Apgar Totals:            JACINTA GIBBS;GARCIA CHAND;DEANNA IVORY     Delivery Details:  Dona Negrete, a 36 year old  female delivered a viable infant with apgars not assigned yet, but appeared normal. Patient was fully dilated and pushing after   hours   minutes in active labor. Delivery was via vaginal, spontaneous  to a sterile field under none  anesthesia. Infant delivered in compound  right  occiput  anterior  position with her hand next to her right cheek. Anterior and posterior shoulders delivered without difficulty. She did have a nuchal cord that was delivered through without difficulty. The cord was clamped, cut twice and   were noted. Cord blood was obtained in routine fashion with the following disposition:  .      Cord complications: nuchal   Placenta delivered at 2022 11:20 PM . Placental disposition was Hospital disposal . Fundal massage performed and fundus found to be firm.     Episiotomy: none    Perineum, vagina, cervix were inspected, and the following lacerations were noted:   Perineal lacerations: none   periurethral laceration: right          on the anterior right labia    Any lacerations were repaired in the usual fashion using 4-0 vicryl without complication, re approximating the edges.    Excellent hemostasis was noted. Needle count correct. Infant and patient in delivery room in good and stable condition.        Penelope Female-Dona [[id number]]    Labor Event Times    Labor onset date: 22 Onset time:  8:30 PM   Dilation complete date: 22 Complete time: 11:06 PM   Start pushing date/time: 2022 6811       Labor Events     labor?: No   steroids: None  Labor Type: Induction/Cervical ripening, Augmentation  Predominate monitoring during 1st stage: continuous electronic fetal monitoring     Antibiotics received during labor?: Yes  Reason for Antibiotics: GBS  Antibiotics received for GBS: Penicillin  Antibiotics Given (GBS): Greater than 4 hours prior to delivery     Rupture identifier: Sac 1  Rupture date/time: 22   Rupture type: Spontaneous rupture of membranes occuring during spontaneous labor or augmentation  Fluid color: Clear     Induction: Misoprostol  Induction date/time:     Cervical ripening date/time: 22   Indications for induction: Post-term Gestation     Augmentation: Oxytocin  Augmentation date/time: 22    Indications for augmentation: Ineffective Contraction Pattern  1:1 continuous labor support provided by?: RN       Delivery/Placenta Date and Time    Delivery Date: 22 Delivery Time: 11:13 PM   Placenta Date/Time: 2022 11:20 PM  Oxytocin given at the time of delivery: after delivery of baby  Delivering clinician: Fahad Gonzalez MD   Other personnel present at delivery:  Provider Role   Elisha Be RN Registered Nurse   Lillian Durbin RN Registered Nurse   Lolita Kwan RN Registered Nurse         Vaginal Counts     Initial count performed by 2 team members:  Two Team Members   FAHAD GONZALEZ NELLIE       Needles Suture Needles Sponges (RETIRED) Instruments   Initial counts 0 0 5    Added to count       Relief counts       Final counts 0 1 5          Placed during labor Accounted for at the end of labor   FSE No NA   IUPC No NA   Cervidil No NA              Final count performed by 2 team members:  Two Team Members   FAHAD GONZALEZ NELLIE      Final count correct?: Yes     Cord    Cord Complications: Nuchal   Nuchal Intervention: delivered through         Nuchal cord description: loose nuchal cord             Labor Events and Shoulder Dystocia    Fetal Tracing Prior to Delivery: Category 2  Fetal Tracing Comments: did have a few decelerations just prior to delivery with good recovery     Delivery (Maternal) (Provider to Complete) ([id number])    Episiotomy: None  Perineal lacerations: None    Periurethral laceration: right Repaired?: Yes   Est. blood loss (mL): 200  Repair suture: 4-0 Vicryl  Number of repair packets: 1  Genital tract inspection done: Pos     Blood Loss  Mother: Dona Negrete #[id number]   Start of Mother's Information    Delivery Blood Loss  06/28/22 2030 - 06/28/22 2357    EBL (mL) Hospital Encounter 200 mL    Delivery QBL (mL) Hospital Encounter 200 mL    Total  400 mL         End of Mother's Information  Mother: Dona Negrete #[id number]          Delivery - Provider to Complete ([id number])    Delivering clinician: Yvonne Alegria MD  Attempted Delivery Types (Choose all that apply): Spontaneous Vaginal Delivery  Delivery Type (Choose the 1 that will go to the Birth History): Vaginal, Spontaneous                   Other personnel:  Provider Role   Elisha Be, RN Registered Nurse   Lillian Durbin, RN Registered Nurse   Lolita Kwan RN Registered Nurse                Placenta    Date/Time: 6/28/2022 11:20 PM  Removal: Spontaneous  Disposition: Hospital disposal           Anesthesia    Method: None                Presentation and Position    Presentation: Compound    Position: Right Occiput Anterior                 Yvonne Alegria MD

## 2022-06-29 NOTE — TELEPHONE ENCOUNTER
[facility] L&D calling FNA to send a message to the rounding Pediatrician to notify them that pt had a baby girl today (06/28) at 11:13PM.      Baby's PCP is Dr. Yvonne Alegria at [facility].      RN contacted the answering service but staff was unsure which provider to page.  Per Smart Web paging, the provider on-call for PEDS rounding is Dr. Jody Lorenzana.    11:42PM: Smart Web used to page Dr. Lorenzana.  The following message was sent: [facility] L&D staff calling to notify rounding pediatrician that pt Dona Negrete [birth year], MRN [id number], had a baby girl today (6/28/22) at 2313. Pt and baby are in room #252 at [facility]. - [facility]    Che Alex RN  [facility] Nurse Advisor    Reason for Disposition    General information question, no triage required and triager able to answer question    Additional Information    Negative: RN needs further essential information from caller in order to complete triage    Negative: Requesting regular office appointment    Negative: [1] Caller requesting NON-URGENT health information AND [2] PCP's office is the best resource    Negative: Health Information question, no triage required and triager able to answer question    Protocols used: INFORMATION ONLY CALL - NO TRIAGE-A-

## 2022-06-29 NOTE — PROGRESS NOTES
[facility] Obstetrics Post-Partum Progress Note          Assessment and Plan:    Assessment:   Post-partum day #1  Normal spontaneous vaginal delivery  L&D complications: None      Doing well.      Plan:   Ambulation encouraged  Anticipate discharge tomorrow           Interval History:   Doing well.  Pain is well-controlled.  No fevers.  No history of foul-smelling vaginal discharge.  Good appetite.  Denies chest pain, shortness of breath, nausea or vomiting.  Vaginal bleeding is similar to a heavy menstrual flow.  Ambulatory.  Breastfeeding well.          Significant Problems:      Past Medical History:   Diagnosis Date     Carrier of group B Streptococcus              Review of Systems:    The patient denies any chest pain, shortness of breath, excessive pain, fever, chills, purulent drainage from the wound, nausea or vomiting.          Medications:   All medications related to the patient's surgery have been reviewed          Physical Exam:   All vitals stable  Uterine fundus is firm, non-tender and at the level of the umbilicus          Data:   All laboratory data related to this surgery reviewed  All imaging studies related to this surgery reviewed    Meagan Coats MD

## 2022-06-30 VITALS
RESPIRATION RATE: 14 BRPM | DIASTOLIC BLOOD PRESSURE: 60 MMHG | HEART RATE: 68 BPM | TEMPERATURE: 98.3 F | SYSTOLIC BLOOD PRESSURE: 134 MMHG | OXYGEN SATURATION: 98 %

## 2022-06-30 PROCEDURE — 99207 PR SUBSEQUENT HOSPITAL CARE FOR MOTHER, 15 MINUTES: CPT | Performed by: FAMILY MEDICINE

## 2022-06-30 ASSESSMENT — ACTIVITIES OF DAILY LIVING (ADL)
ADLS_ACUITY_SCORE: 18

## 2022-06-30 NOTE — PLAN OF CARE
"  Problem: Plan of Care - These are the overarching goals to be used throughout the patient stay.    Goal: Plan of Care Review/Shift Note  Description: The Plan of Care Review/Shift note should be completed every shift.  The Outcome Evaluation is a brief statement about your assessment that the patient is improving, declining, or no change.  This information will be displayed automatically on your shift note.  Outcome: Ongoing, Progressing  Goal: Patient-Specific Goal (Individualized)  Description: You can add care plan individualizations to a care plan. Examples of Individualization might be:  \"Parent requests to be called daily at 9am for status\", \"I have a hard time hearing out of my right ear\", or \"Do not touch me to wake me up as it startles me\".  Outcome: Ongoing, Progressing  Goal: Absence of Hospital-Acquired Illness or Injury  Outcome: Ongoing, Progressing  Goal: Optimal Comfort and Wellbeing  Outcome: Ongoing, Progressing  Goal: Readiness for Transition of Care  Outcome: Ongoing, Progressing    Patient progressing well at this time. Vitally stable. Pain well controlled. Patient requesting discharge today.      "

## 2022-06-30 NOTE — DISCHARGE SUMMARY
[facility] Discharge Summary    Dona Negrete MRN# [id number]   Age: 36 year old YOB: 1985     Date of Admission:  2022  Date of Discharge::  2022  Admitting Physician:  Yvonne Alegria MD  Discharge Physician:  Meagan Coats MD     Home clinic: [facility]          Admission Diagnoses:   Post-dates pregnancy [O48.0]          Discharge Diagnosis:   Normal spontaneous vaginal delivery  Intrauterine pregnancy at 41 weeks gestation          Procedures:   Procedure(s): No additional procedures performed           Medications Prior to Admission:     Medications Prior to Admission   Medication Sig Dispense Refill Last Dose     clindamycin (CLEOCIN T) 1 % external solution Apply topically 2 times daily 30 mL 3      Prenatal 27-1 MG TABS Take 1 tablet by mouth daily                Discharge Medications:     Current Discharge Medication List      CONTINUE these medications which have NOT CHANGED    Details   clindamycin (CLEOCIN T) 1 % external solution Apply topically 2 times daily  Qty: 30 mL, Refills: 3    Associated Diagnoses: Acne, unspecified acne type      Prenatal 27-1 MG TABS Take 1 tablet by mouth daily                   Consultations:   No consultations were requested during this admission          Brief History of Labor:   Dona Negrete, a 36 year old  female delivered a viable infant with apgars not assigned yet, but appeared normal. Patient was fully dilated and pushing after   hours   minutes in active labor. Delivery was via vaginal, spontaneous  to a sterile field under none  anesthesia. Infant delivered in compound  right  occiput  anterior  position with her hand next to her right cheek. Anterior and posterior shoulders delivered without difficulty. She did have a nuchal cord that was delivered through without difficulty. The cord was clamped, cut twice and   were noted. Cord blood was obtained in routine fashion with the following  disposition:  .    Cord complications: nuchal   Placenta delivered at 6/28/2022 11:20 PM . Placental disposition was Hospital disposal . Fundal massage performed and fundus found to be firm.   Episiotomy: none    Perineum, vagina, cervix were inspected, and the following lacerations were noted:   Perineal lacerations: none   periurethral laceration: right on the anterior right labia  Any lacerations were repaired in the usual fashion using 4-0 vicryl without complication, re approximating the edges.           Hospital Course:   The patient's hospital course was unremarkable.  On discharge, her pain was well controlled. Vaginal bleeding is similar to peak menstrual flow.  Voiding without difficulty.  Ambulating well and tolerating a normal diet.  No fever.  Breastfeeding overall going well.  Infant is stable.  No bowel movement yet.  She was discharged on post-partum day #2.    Post-partum hemoglobin:   Hemoglobin   Date Value Ref Range Status   03/30/2022 11.5 (L) 11.7 - 15.7 g/dL Final             Discharge Instructions and Follow-Up:   Discharge diet: Regular   Discharge activity: Activity as tolerated   Discharge follow-up: Follow up with primary care provider in 4-6 weeks           Discharge Disposition:   Discharged to home       Meagan Coats MD

## 2022-06-30 NOTE — PROGRESS NOTES
Discharge instructions reviewed with pt. Pt verbalized understanding and denies questions/concerns at this time.

## 2022-08-05 ENCOUNTER — MEDICAL CORRESPONDENCE (OUTPATIENT)
Dept: FAMILY MEDICINE | Facility: CLINIC | Age: 37
End: 2022-08-05

## 2022-09-02 ENCOUNTER — PRENATAL OFFICE VISIT (OUTPATIENT)
Dept: FAMILY MEDICINE | Facility: CLINIC | Age: 37
End: 2022-09-02
Payer: COMMERCIAL

## 2022-09-02 VITALS
BODY MASS INDEX: 32.02 KG/M2 | DIASTOLIC BLOOD PRESSURE: 62 MMHG | OXYGEN SATURATION: 98 % | WEIGHT: 206 LBS | HEART RATE: 78 BPM | SYSTOLIC BLOOD PRESSURE: 110 MMHG | RESPIRATION RATE: 16 BRPM

## 2022-09-02 PROCEDURE — 99207 PR POST PARTUM EXAM: CPT | Performed by: FAMILY MEDICINE

## 2022-09-02 NOTE — PROGRESS NOTES
Dona is here for a 6-week postpartum checkup.    She had a  of a viable girl, weight 8 pounds 15 oz., with no complications.  Since delivery, she has been breast feeding.  She has no signs of infection, bleeding or other complications.  She is not pregnant.  We discussed contraceptions and she has chosen natural family planning.      She bled for about 4 weeks postpartum. She then has 2 weeks off and then had a cycle. It was longer than normal, was about 7 days. She is usually about 5 days. Her milk supply was ok with this. She is doing a lot of pumping as well.     She is not sure about birth control for now. She does not tolerate birth control well. She got dizzy and would pass out in the shower with the birth control.     They have started with intercourse as well. It was fine.     EXAM:    HEENT: grossly normal.  NECK: no lymphadenopathy or thyroidomegaly.  LUNGS: CTA X 2, no rales or crackles.  BACK: No spinal or CVA tenderness.  HEART: RRR without murmurs clicks or gallops.  ABDOMEN: soft, non tender, good bowel sounds, without masses rebound, guarding or tenderness.    PELVIC:    Deferred, anterior tearing only and it feels well healed, no perineal discomfort.     EXTREMITIES: Warm to touch, good pulses, no ankle edema or calf tenderness.  NEUROLOGIC: grossly normal.    ASSESSMENT:   Normal 6-week postpartum exam after .    PLAN:  Pap smear up to date and natural family planning for contraception.  Ok to reach out for POP as desired.

## 2022-09-23 ENCOUNTER — TELEPHONE (OUTPATIENT)
Dept: FAMILY MEDICINE | Facility: CLINIC | Age: 37
End: 2022-09-23

## 2022-09-23 NOTE — TELEPHONE ENCOUNTER
Paperwork to clear patient to return to work after maternity leave arrived in my personal fax. It was completed by Doctor Alegria and returned via fax to [facility]. Patient was informed. Scanned into patients chart.     Mihaela Carter CMA

## 2022-10-03 ENCOUNTER — HEALTH MAINTENANCE LETTER (OUTPATIENT)
Age: 37
End: 2022-10-03

## 2022-11-02 ENCOUNTER — MEDICAL CORRESPONDENCE (OUTPATIENT)
Dept: HEALTH INFORMATION MANAGEMENT | Facility: CLINIC | Age: 37
End: 2022-11-02

## 2023-01-06 ENCOUNTER — MEDICAL CORRESPONDENCE (OUTPATIENT)
Dept: HEALTH INFORMATION MANAGEMENT | Facility: CLINIC | Age: 38
End: 2023-01-06

## 2023-02-14 ENCOUNTER — MYC MEDICAL ADVICE (OUTPATIENT)
Dept: FAMILY MEDICINE | Facility: CLINIC | Age: 38
End: 2023-02-14
Payer: COMMERCIAL

## 2023-02-15 ENCOUNTER — NURSE TRIAGE (OUTPATIENT)
Dept: FAMILY MEDICINE | Facility: CLINIC | Age: 38
End: 2023-02-15
Payer: COMMERCIAL

## 2023-02-15 NOTE — TELEPHONE ENCOUNTER
Reason for Disposition    Bleeding or spotting between regular periods occurs more than three cycles (3 months) this past year    Additional Information    Negative: SEVERE vaginal bleeding (e.g., continuous red blood from vagina, or large blood clots) and very weak (can't stand)    Negative: Passed out (i.e., fainted, collapsed and was not responding)    Negative: Difficult to awaken or acting confused (e.g., disoriented, slurred speech)    Negative: Shock suspected (e.g., cold/pale/clammy skin, too weak to stand, low BP, rapid pulse)    Negative: Sounds like a life-threatening emergency to the triager    Negative: Pregnant 20 or more weeks (5 months or more)    Negative: Pregnant < 20 weeks (less than 5 months)    Negative: Postpartum (from 0 to 6 weeks after delivery)    Negative: SEVERE abdominal pain (e.g., excruciating)    Negative: SEVERE dizziness (e.g., unable to stand, requires support to walk, feels like passing out now)    Negative: SEVERE vaginal bleeding (e.g., soaking 2 pads or tampons per hour and present 2 or more hours; 1 menstrual cup every 2 hours)    Negative: MODERATE vaginal bleeding (i.e., soaking pad or tampon per hour and present > 6 hours; 1 menstrual cup every 6 hours)    Negative: Pale skin (pallor) of new-onset or worsening    Negative: Constant abdominal pain lasting > 2 hours    Negative: Patient sounds very sick or weak to the triager    Negative: Taking Coumadin (warfarin) or other strong blood thinner, or known bleeding disorder (e.g., thrombocytopenia)    Negative: Skin bruises or nosebleed and not caused by an injury    Negative: Bleeding/spotting after procedure (e.g., biopsy) or pelvic examination (e.g., pap smear) that persists > 3 days    Negative: Patient wants to be seen    Negative: Passed tissue (e.g., gray-white)    Negative: Periods with > 6 soaked pads or tampons per day    Negative: Periods last > 7 days    Negative: Uses menstrual cups and more than 80 ml blood  "per menstrual period    Negative: Missed period has occurred 2 or more times in the last year and the cause is not known    Negative: Menstrual cycle < 21 days OR > 35 days, and occurs more than two cycles (2 months) this past year    Negative: Bleeding or spotting between regular periods occurs more than three cycles (3 months), and using birth control medicine (e.g., pills, patch, Depo-Provera, Implanon, vaginal ring, Mirena IUD)    Negative: Bleeding or spotting occurs after hysterectomy    Negative: Age > 39 years with irregular or excessive bleeding    Negative: Bleeding or spotting occurs after sex  (Exception: First intercourse.)    Negative: Normal menstrual flow    Negative: MILD bleeding or SPOTTING and could be pregnant (e.g., missed last period)    Negative: MILD bleeding or SPOTTING after procedure (e.g., biopsy) or pelvic examination (e.g., pap smear) persisting < 4 days    Negative: MILD bleeding or SPOTTING and immediately follows first intercourse    Negative: Taking birth control pills and hasn't missed taking any pills    Negative: Taking birth control pills and has missed one or more pills; or took a progestin-only pill late    Negative: Using DepoProvera subdermal implant    Negative: Has Implanon subdermal implant    Negative: Using birth control patch (i.e., transdermal contraceptive system)    Negative: Using vaginal contraceptive ring (i.e., NuvaRing)    Negative: Using Mirena IUD (a special IUD that releases the hormone progestin)    Negative: Vaginal discharge is main symptom and bleeding is slight    Answer Assessment - Initial Assessment Questions  1. AMOUNT: \"Describe the bleeding that you are having.\"     - SPOTTING: spotting, or pinkish / brownish mucous discharge; does not fill panty liner or pad     Brownish / red blood   Enough where you go to bathroom and need to do something, pad/ tampon      - MILD:  less than 1 pad / hour; less than patient's usual menstrual bleeding    - " "MODERATE: 1-2 pads / hour; 1 menstrual cup every 6 hours; small-medium blood clots (e.g., pea, grape, small coin)    - SEVERE: soaking 2 or more pads/hour for 2 or more hours; 1 menstrual cup every 2 hours; bleeding not contained by pads or continuous red blood from vagina; large blood clots (e.g., golf ball, large coin)          Mild     2. ONSET: \"When did the bleeding begin?\" \"Is it continuing now?\"      Last night     3. MENSTRUAL PERIOD: \"When was the last normal menstrual period?\" \"How is this different than your period?\"        Never had in between cycle bleeding    4. REGULARITY: \"How regular are your periods?\"      Regular    5. ABDOMINAL PAIN: \"Do you have any pain?\" \"How bad is the pain?\"  (e.g., Scale 1-10; mild, moderate, or severe)    - MILD (1-3): doesn't interfere with normal activities, abdomen soft and not tender to touch     - MODERATE (4-7): interferes with normal activities or awakens from sleep, abdomen tender to touch     - SEVERE (8-10): excruciating pain, doubled over, unable to do any normal activities       No pain     6. PREGNANCY: \"Could you be pregnant?\" \"Are you sexually active?\" \"Did you recently give birth?\"        Yes, she will take test     7. BREASTFEEDING: \"Are you breastfeeding?\"      Yes    8. HORMONES: \"Are you taking any hormone medications, prescription or OTC?\" (e.g., birth control pills, estrogen)      No    9. BLOOD THINNERS: \"Do you take any blood thinners?\" (e.g., Coumadin/warfarin, Pradaxa/dabigatran, aspirin)        no    10. CAUSE: \"What do you think is causing the bleeding?\" (e.g., recent gyn surgery, recent gyn procedure; known bleeding disorder, cervical cancer, polycystic ovarian disease, fibroids)            no  11. HEMODYNAMIC STATUS: \"Are you weak or feeling lightheaded?\" If Yes, ask: \"Can you stand and walk normally?\"           no  12. OTHER SYMPTOMS: \"What other symptoms are you having with the bleeding?\" (e.g., passed tissue, vaginal discharge, fever, " menstrual-type cramps)          No    Protocols used: VAGINAL BLEEDING - [id number]-U-HF

## 2023-03-20 ENCOUNTER — MYC MEDICAL ADVICE (OUTPATIENT)
Dept: FAMILY MEDICINE | Facility: CLINIC | Age: 38
End: 2023-03-20
Payer: COMMERCIAL

## 2023-03-21 ENCOUNTER — NURSE TRIAGE (OUTPATIENT)
Dept: FAMILY MEDICINE | Facility: CLINIC | Age: 38
End: 2023-03-21
Payer: COMMERCIAL

## 2023-03-21 NOTE — TELEPHONE ENCOUNTER
Hello,     I've got a progressive tightness of my upper abdomen that is constant and worsening since late Saturday 3/18. I don't recall any precipitating event but it started as mild upper abdomen sore kind of like after having a really bad cough, Sunday more tight like when you are in the 9th month of pregnancy any there is no more room with constant pressure on the diaphragm to today with even more pain, constant and feels like every drink of fluids and bite of food is going down sideways. I haven't ever experienced this? It is worse if lying on my left side and constant day and night.  I've tried tylenol and antacids without relief. I am having normal bowel movements.   Typically I would just ride it out but as it is progressing I just wanted to see if there is something more serious I should be concerned about or if I can just keep monitoring and hopefully it will go away?     Thank you!  Dona

## 2023-03-21 NOTE — TELEPHONE ENCOUNTER
"Patient declined disposition and wants to come to clinic for an appointment. Appointment scheduled for Thursday with Dr. Stack. RN advised ED/ UCC for any worsening symptoms before Thursday. Care advise given.    Reason for Disposition    Constant abdominal pain lasting > 2 hours    Additional Information    Negative: Passed out (i.e., fainted, collapsed and was not responding)    Negative: Shock suspected (e.g., cold/pale/clammy skin, too weak to stand, low BP, rapid pulse)    Negative: Visible sweat on face or sweat is dripping down    Negative: Chest pain    Negative: SEVERE abdominal pain (e.g., excruciating)    Negative: Pain lasting > 10 minutes and over 50 years old    Negative: Pain lasting > 10 minutes and over 40 years old and associated chest, arm, neck, upper back, or jaw pain    Negative: Pain lasting > 10 minutes and over 35 years old and at least one cardiac risk factor (i.e., hypertension, diabetes, obesity, smoker or strong family history of heart disease)    Negative: Pain lasting > 10 minutes and history of heart disease (i.e., heart attack, bypass surgery, angina, angioplasty, CHF)    Negative: Recent injury to the abdomen    Negative: Vomiting red blood or black (coffee ground) material    Negative: Bloody, black, or tarry bowel movements  (Exception: Chronic-unchanged black-grey bowel movements and is taking iron pills or Pepto-Bismol.)    Negative: Pregnant 20 weeks or more and new hand or face swelling    Answer Assessment - Initial Assessment Questions  1. LOCATION: \"Where does it hurt?\"    Upper abdomen under breast   Felt restricted under bra line  Feels like her diaphragm is being shoved into her upper chest  Eating and drinking    everything felt like it was going down side ways and instant fullness  Can feel it now when she takes it in a deep breath   swallowing felt like it was going down wrong and it was sitting on her bra line   2. RADIATION: \"Does the pain shoot anywhere else?\" " "(e.g., chest, back)        Took omeprazole yesterday  Today not as intense  Yesterday she thought she should go in   No acid reflux  Passing gas normal  Having normal stools  No nausea no vomiting  No fever  Did not swallow anything wrong     3. ONSET: \"When did the pain begin?\" (e.g., minutes, hours or days ago)       Friday evening  4. SUDDEN: \"Gradual or sudden onset?\"      Progressive    Abdominal   5. PATTERN \"Does the pain come and go, or is it constant?\"     - If constant: \"Is it getting better, staying the same, or worsening?\"       (Note: Constant means the pain never goes away completely; most serious pain is constant and it progresses)      - If intermittent: \"How long does it last?\" \"Do you have pain now?\"      (Note: Intermittent means the pain goes away completely between bouts)        6. SEVERITY: \"How bad is the pain?\"  (e.g., Scale 1-10; mild, moderate, or severe)     - MILD (1-3): doesn't interfere with normal activities, abdomen soft and not tender to touch      - MODERATE (4-7): interferes with normal activities or awakens from sleep, abdomen tender to touch      - SEVERE (8-10): excruciating pain, doubled over, unable to do any normal activities          7. RECURRENT SYMPTOM: \"Have you ever had this type of stomach pain before?\" If Yes, ask: \"When was the last time?\" and \"What happened that time?\"       no  8. AGGRAVATING FACTORS: \"Does anything seem to cause this pain?\" (e.g., foods, stress, alcohol)      food  9. CARDIAC SYMPTOMS: \"Do you have any of the following symptoms: chest pain, difficulty breathing, sweating, nausea?\"      No    Breathing \" restricted\" yesterday  uncomfortable to breathe and when she took a deep breathe there was extra tightening\"  10. OTHER SYMPTOMS: \"Do you have any other symptoms?\" (e.g., back pain, diarrhea, fever, urination pain, vomiting)        No   11. PREGNANCY: \"Is there any chance you are pregnant?\" \"When was your last menstrual period?\"        " N/a    Protocols used: ABDOMINAL PAIN - UPPER-A-OH

## 2023-03-23 ENCOUNTER — OFFICE VISIT (OUTPATIENT)
Dept: FAMILY MEDICINE | Facility: CLINIC | Age: 38
End: 2023-03-23
Payer: COMMERCIAL

## 2023-03-23 VITALS
RESPIRATION RATE: 16 BRPM | WEIGHT: 202 LBS | BODY MASS INDEX: 31.71 KG/M2 | DIASTOLIC BLOOD PRESSURE: 68 MMHG | TEMPERATURE: 98.2 F | SYSTOLIC BLOOD PRESSURE: 110 MMHG | OXYGEN SATURATION: 97 % | HEIGHT: 67 IN | HEART RATE: 61 BPM

## 2023-03-23 DIAGNOSIS — R10.84 ABDOMINAL PAIN, GENERALIZED: Primary | ICD-10-CM

## 2023-03-23 LAB
BASOPHILS # BLD AUTO: 0 10E3/UL (ref 0–0.2)
BASOPHILS NFR BLD AUTO: 1 %
EOSINOPHIL # BLD AUTO: 0.1 10E3/UL (ref 0–0.7)
EOSINOPHIL NFR BLD AUTO: 2 %
ERYTHROCYTE [DISTWIDTH] IN BLOOD BY AUTOMATED COUNT: 11.7 % (ref 10–15)
HCT VFR BLD AUTO: 37.6 % (ref 35–47)
HGB BLD-MCNC: 12.8 G/DL (ref 11.7–15.7)
IMM GRANULOCYTES # BLD: 0 10E3/UL
IMM GRANULOCYTES NFR BLD: 0 %
LYMPHOCYTES # BLD AUTO: 2.7 10E3/UL (ref 0.8–5.3)
LYMPHOCYTES NFR BLD AUTO: 39 %
MCH RBC QN AUTO: 28.9 PG (ref 26.5–33)
MCHC RBC AUTO-ENTMCNC: 34 G/DL (ref 31.5–36.5)
MCV RBC AUTO: 85 FL (ref 78–100)
MONOCYTES # BLD AUTO: 0.4 10E3/UL (ref 0–1.3)
MONOCYTES NFR BLD AUTO: 6 %
NEUTROPHILS # BLD AUTO: 3.7 10E3/UL (ref 1.6–8.3)
NEUTROPHILS NFR BLD AUTO: 53 %
PLATELET # BLD AUTO: 292 10E3/UL (ref 150–450)
RBC # BLD AUTO: 4.43 10E6/UL (ref 3.8–5.2)
WBC # BLD AUTO: 6.9 10E3/UL (ref 4–11)

## 2023-03-23 PROCEDURE — 83690 ASSAY OF LIPASE: CPT | Performed by: FAMILY MEDICINE

## 2023-03-23 PROCEDURE — 85025 COMPLETE CBC W/AUTO DIFF WBC: CPT | Performed by: FAMILY MEDICINE

## 2023-03-23 PROCEDURE — 36415 COLL VENOUS BLD VENIPUNCTURE: CPT | Performed by: FAMILY MEDICINE

## 2023-03-23 PROCEDURE — 99214 OFFICE O/P EST MOD 30 MIN: CPT | Performed by: FAMILY MEDICINE

## 2023-03-23 PROCEDURE — 82150 ASSAY OF AMYLASE: CPT | Performed by: FAMILY MEDICINE

## 2023-03-23 PROCEDURE — 80053 COMPREHEN METABOLIC PANEL: CPT | Performed by: FAMILY MEDICINE

## 2023-03-23 ASSESSMENT — PAIN SCALES - GENERAL: PAINLEVEL: MODERATE PAIN (5)

## 2023-03-23 NOTE — PROGRESS NOTES
"  Assessment & Plan     (R10.78) Abdominal pain, generalized  (primary encounter diagnosis)  Comment: pt has epigastric and LUQ for 6 days. She felt tightness, no radiation. First 2 days was the worse, then pain improved to mild after she took prilosec, tylenol. Drinking and eating make the pain worse. Denies fever, n/v/d/c, black stool,  sick contact, eating outside. Denies heavy lifting and injury.   Pt is postpartum 9 month and breast feeding. Denies smoke, alcohol and spicy food. LMP 2 days ago.   Exam not remarkable with mild tenderness at epigastric and LUQ.  Normal cbc.  likely she has gastritis, DDx: stomach ulcer,  pancreatitis, gallbladder stone etc.   Plan: CBC with platelets and differential,         Comprehensive metabolic panel (BMP + Alb, Alk         Phos, ALT, AST, Total. Bili, TP), Lipase,         Amylase         Advise rest, and push fluid with soft brat diet, liquid diet, warm soap etc as tolerated. Continue otc prilosec prn. Avoid alcohol, spicy diet.   If symptoms worse or develops fever, n/v/d/c, she needs to go to er to have abd ct. She understands.     :[id number]}     BMI:   Estimated body mass index is 31.4 kg/m  as calculated from the following:    Height as of this encounter: 1.708 m (5' 7.25\").    Weight as of this encounter: 91.6 kg (202 lb).       See Patient Instructions    Pauly Stack MD  [facility]    Mo Carcamo is a 37 year old, presenting for the following health issues:  Abdominal Pain (Upper abdominal pain, 9 month postpartum , breast feeding, feeling like foods going down sideways, feeling full, pressure, no burning, normal BM )    Additional Questions 3/23/2023   Roomed by alina gaona cma   Accompanied by -     History of Present Illness       Reason for visit:  New upper epigastric tightness/pain with eating, drinking and deep breaths  Symptom onset:  3-7 days ago  Symptoms include:  Progressive upper abd/bra line tightness, feels like food and " "fluids going down sideways. Felt with deep breaths in as well.  Symptom intensity:  Moderate  Symptom progression:  Staying the same  Had these symptoms before:  No  What makes it worse:  It has been progressive over past 3 days, lying on left side worsens  What makes it better:  Omeprazole seemed to help lessen severity    She eats 2-3 servings of fruits and vegetables daily.She consumes 0 sweetened beverage(s) daily.She exercises with enough effort to increase her heart rate 30 to 60 minutes per day.  She exercises with enough effort to increase her heart rate 5 days per week.   She is taking medications regularly.               Review of Systems   Constitutional, HEENT, cardiovascular, pulmonary, gi and gu systems are negative, except as otherwise noted.      Objective    /68   Pulse 61   Temp 98.2  F (36.8  C)   Resp 16   Ht 1.708 m (5' 7.25\")   Wt 91.6 kg (202 lb)   LMP 03/19/2023 (Exact Date)   SpO2 97%   Breastfeeding Yes   BMI 31.40 kg/m    Body mass index is 31.4 kg/m .  Physical Exam   GENERAL: healthy, alert and no distress  NECK: no adenopathy, no asymmetry, masses, or scars and thyroid normal to palpation  RESP: lungs clear to auscultation - no rales, rhonchi or wheezes  CV: regular rate and rhythm, normal S1 S2, no S3 or S4, no murmur, click or rub, no peripheral edema and peripheral pulses strong  ABDOMEN: soft, mild tenderness on epigastric and LUQ, no hepatosplenomegaly, no masses and bowel sounds normal  MS: no gross musculoskeletal defects noted, no edema     CBC RESULTS: Recent Labs   Lab Test 03/23/23  1526   WBC 6.9   RBC 4.43   HGB 12.8   HCT 37.6   MCV 85   MCH 28.9   MCHC 34.0   RDW 11.7                    "

## 2023-03-24 ENCOUNTER — TELEPHONE (OUTPATIENT)
Dept: FAMILY MEDICINE | Facility: CLINIC | Age: 38
End: 2023-03-24
Payer: COMMERCIAL

## 2023-03-24 LAB
ALBUMIN SERPL BCG-MCNC: 4.5 G/DL (ref 3.5–5.2)
ALP SERPL-CCNC: 58 U/L (ref 35–104)
ALT SERPL W P-5'-P-CCNC: 19 U/L (ref 10–35)
AMYLASE SERPL-CCNC: 62 U/L (ref 28–100)
ANION GAP SERPL CALCULATED.3IONS-SCNC: 13 MMOL/L (ref 7–15)
AST SERPL W P-5'-P-CCNC: 21 U/L (ref 10–35)
BILIRUB SERPL-MCNC: 0.2 MG/DL
BUN SERPL-MCNC: 13.2 MG/DL (ref 6–20)
CALCIUM SERPL-MCNC: 9.6 MG/DL (ref 8.6–10)
CHLORIDE SERPL-SCNC: 104 MMOL/L (ref 98–107)
CREAT SERPL-MCNC: 0.82 MG/DL (ref 0.51–0.95)
DEPRECATED HCO3 PLAS-SCNC: 22 MMOL/L (ref 22–29)
GFR SERPL CREATININE-BSD FRML MDRD: >90 ML/MIN/1.73M2
GLUCOSE SERPL-MCNC: 101 MG/DL (ref 70–99)
LIPASE SERPL-CCNC: 91 U/L (ref 13–60)
POTASSIUM SERPL-SCNC: 4.2 MMOL/L (ref 3.4–5.3)
PROT SERPL-MCNC: 7.5 G/DL (ref 6.4–8.3)
SODIUM SERPL-SCNC: 139 MMOL/L (ref 136–145)

## 2023-03-24 NOTE — TELEPHONE ENCOUNTER
I have reviewed all lab results     1) mild elevated pancreat lipase that means likely you have acute pancreatitis that caused your abdomen pain.  Please continue diet control with liquid and soft diet and gradually advance as tolerated. If the pain persists or worse please go to er. I ordered abdomen ultrasound and the hospital will call you for the appointment. Advise to have follow-up visit in 2 weeks.   2) normal liver, kidney function, normal non fasting blood glucose, normal blood cell count, normal electrolytes.          Pauly Stack MD on 3/24/2023 at 8:23 AM'      Notified patient of results. She needs a two week follow up with PCP. She has an appointment on 4/7/23 with her daughter for a well child check and she is wondering if Dr. Alegria could see both of them together. Will route to Dr. Alegria to advise.    Sandra Castro RN on 3/24/2023 at 8:55 AM

## 2023-03-29 ENCOUNTER — HOSPITAL ENCOUNTER (OUTPATIENT)
Dept: ULTRASOUND IMAGING | Facility: CLINIC | Age: 38
Discharge: HOME | End: 2023-03-29
Attending: FAMILY MEDICINE | Admitting: FAMILY MEDICINE
Payer: COMMERCIAL

## 2023-03-29 DIAGNOSIS — R10.84 ABDOMINAL PAIN, GENERALIZED: ICD-10-CM

## 2023-03-29 PROCEDURE — 76700 US EXAM ABDOM COMPLETE: CPT

## 2023-04-07 ENCOUNTER — LAB (OUTPATIENT)
Dept: LAB | Facility: CLINIC | Age: 38
End: 2023-04-07
Payer: COMMERCIAL

## 2023-04-07 DIAGNOSIS — R16.0 MASS OF RIGHT LOBE OF LIVER: ICD-10-CM

## 2023-04-07 DIAGNOSIS — R10.84 ABDOMINAL PAIN, GENERALIZED: ICD-10-CM

## 2023-04-07 LAB
ALBUMIN SERPL BCG-MCNC: 4.8 G/DL (ref 3.5–5.2)
ALP SERPL-CCNC: 54 U/L (ref 35–104)
ALT SERPL W P-5'-P-CCNC: 15 U/L (ref 10–35)
ANION GAP SERPL CALCULATED.3IONS-SCNC: 14 MMOL/L (ref 7–15)
AST SERPL W P-5'-P-CCNC: 20 U/L (ref 10–35)
BILIRUB SERPL-MCNC: 0.5 MG/DL
BUN SERPL-MCNC: 12 MG/DL (ref 6–20)
CALCIUM SERPL-MCNC: 10 MG/DL (ref 8.6–10)
CHLORIDE SERPL-SCNC: 104 MMOL/L (ref 98–107)
CREAT SERPL-MCNC: 0.78 MG/DL (ref 0.51–0.95)
DEPRECATED HCO3 PLAS-SCNC: 23 MMOL/L (ref 22–29)
ERYTHROCYTE [DISTWIDTH] IN BLOOD BY AUTOMATED COUNT: 11.8 % (ref 10–15)
GFR SERPL CREATININE-BSD FRML MDRD: >90 ML/MIN/1.73M2
GLUCOSE SERPL-MCNC: 88 MG/DL (ref 70–99)
HCT VFR BLD AUTO: 40.6 % (ref 35–47)
HGB BLD-MCNC: 13.8 G/DL (ref 11.7–15.7)
LIPASE SERPL-CCNC: 51 U/L (ref 13–60)
MCH RBC QN AUTO: 28.6 PG (ref 26.5–33)
MCHC RBC AUTO-ENTMCNC: 34 G/DL (ref 31.5–36.5)
MCV RBC AUTO: 84 FL (ref 78–100)
PLATELET # BLD AUTO: 281 10E3/UL (ref 150–450)
POTASSIUM SERPL-SCNC: 4.3 MMOL/L (ref 3.4–5.3)
PROT SERPL-MCNC: 7.6 G/DL (ref 6.4–8.3)
RBC # BLD AUTO: 4.82 10E6/UL (ref 3.8–5.2)
SODIUM SERPL-SCNC: 141 MMOL/L (ref 136–145)
WBC # BLD AUTO: 6.2 10E3/UL (ref 4–11)

## 2023-04-07 PROCEDURE — 36415 COLL VENOUS BLD VENIPUNCTURE: CPT

## 2023-04-07 PROCEDURE — 85027 COMPLETE CBC AUTOMATED: CPT

## 2023-04-07 PROCEDURE — 83690 ASSAY OF LIPASE: CPT

## 2023-04-07 PROCEDURE — 80053 COMPREHEN METABOLIC PANEL: CPT

## 2023-04-12 ENCOUNTER — HOSPITAL ENCOUNTER (OUTPATIENT)
Dept: MRI IMAGING | Facility: CLINIC | Age: 38
Discharge: HOME | End: 2023-04-12
Attending: FAMILY MEDICINE | Admitting: FAMILY MEDICINE
Payer: COMMERCIAL

## 2023-04-12 DIAGNOSIS — R16.0 MASS OF RIGHT LOBE OF LIVER: ICD-10-CM

## 2023-04-12 DIAGNOSIS — R10.84 ABDOMINAL PAIN, GENERALIZED: ICD-10-CM

## 2023-04-12 PROCEDURE — 255N000002 HC RX 255 OP 636: Performed by: FAMILY MEDICINE

## 2023-04-12 PROCEDURE — 74183 MRI ABD W/O CNTR FLWD CNTR: CPT

## 2023-04-12 PROCEDURE — A9585 GADOBUTROL INJECTION: HCPCS | Performed by: FAMILY MEDICINE

## 2023-04-12 RX ORDER — GADOBUTROL 604.72 MG/ML
9 INJECTION INTRAVENOUS ONCE
Status: COMPLETED | OUTPATIENT
Start: 2023-04-12 | End: 2023-04-12

## 2023-04-12 RX ADMIN — GADOBUTROL 9 ML: 604.72 INJECTION INTRAVENOUS at 12:03

## 2023-05-20 ENCOUNTER — HEALTH MAINTENANCE LETTER (OUTPATIENT)
Age: 38
End: 2023-05-20

## 2023-07-29 ASSESSMENT — ENCOUNTER SYMPTOMS
EYE PAIN: 0
SHORTNESS OF BREATH: 0
FEVER: 0
SORE THROAT: 0
DIARRHEA: 0
ARTHRALGIAS: 0
FREQUENCY: 0
DIZZINESS: 0
HEMATOCHEZIA: 0
PALPITATIONS: 0
CHILLS: 0
COUGH: 0
DYSURIA: 0
HEARTBURN: 0
BREAST MASS: 0
MYALGIAS: 0
CONSTIPATION: 0
HEMATURIA: 0
PARESTHESIAS: 0
NAUSEA: 0
NERVOUS/ANXIOUS: 0
HEADACHES: 0
ABDOMINAL PAIN: 0
JOINT SWELLING: 0
WEAKNESS: 0

## 2023-08-01 ENCOUNTER — OFFICE VISIT (OUTPATIENT)
Dept: FAMILY MEDICINE | Facility: CLINIC | Age: 38
End: 2023-08-01
Payer: COMMERCIAL

## 2023-08-01 VITALS
HEIGHT: 67 IN | RESPIRATION RATE: 15 BRPM | SYSTOLIC BLOOD PRESSURE: 112 MMHG | HEART RATE: 60 BPM | TEMPERATURE: 98 F | DIASTOLIC BLOOD PRESSURE: 64 MMHG | WEIGHT: 207 LBS | BODY MASS INDEX: 32.49 KG/M2 | OXYGEN SATURATION: 99 %

## 2023-08-01 DIAGNOSIS — Z00.00 ENCOUNTER FOR ROUTINE HISTORY AND PHYSICAL EXAMINATION OF ADULT: Primary | ICD-10-CM

## 2023-08-01 DIAGNOSIS — R21 RASH: ICD-10-CM

## 2023-08-01 PROCEDURE — 99395 PREV VISIT EST AGE 18-39: CPT | Performed by: FAMILY MEDICINE

## 2023-08-01 ASSESSMENT — ENCOUNTER SYMPTOMS
NERVOUS/ANXIOUS: 0
CONSTIPATION: 0
HEMATOCHEZIA: 0
NAUSEA: 0
SORE THROAT: 0
PALPITATIONS: 0
FEVER: 0
CHILLS: 0
DIZZINESS: 0
PARESTHESIAS: 0
DYSURIA: 0
ARTHRALGIAS: 0
BREAST MASS: 0
JOINT SWELLING: 0
HEARTBURN: 0
HEMATURIA: 0
MYALGIAS: 0
DIARRHEA: 0
SHORTNESS OF BREATH: 0
HEADACHES: 0
COUGH: 0
WEAKNESS: 0
FREQUENCY: 0
EYE PAIN: 0
ABDOMINAL PAIN: 0

## 2023-08-01 NOTE — PROGRESS NOTES
SUBJECTIVE:   CC: Dona is an 37 year old who presents for preventive health visit.       2023    12:49 PM   Additional Questions   Roomed by Mihaela       Healthy Habits:     Getting at least 3 servings of Calcium per day:  Yes    Bi-annual eye exam:  NO    Dental care twice a year:  Yes    Sleep apnea or symptoms of sleep apnea:  None    Diet:  Regular (no restrictions)    Frequency of exercise:  2-3 days/week    Duration of exercise:  15-30 minutes    Taking medications regularly:  Yes    Medication side effects:  Not applicable    Additional concerns today:  Yes    She is doing well generally.     She does have some issues with her skin. She does note that things are getting better and worse with her cycles. She does think that the clindamycin solution is ok at times. She does have a face wash that helps some with the inflammation and then it doesn't. She did try a wash that is supposed to target rosacea as well and that is helping a little as well.     Doesn't do well with OCP. She is not taking the PNV anymore, has been done breast feeding for about a month and that hasn't helped much.       Have you ever done Advance Care Planning? (For example, a Health Directive, POLST, or a discussion with a medical provider or your loved ones about your wishes): No, advance care planning information given to patient to review.  Patient declined advance care planning discussion at this time.    Social History     Tobacco Use    Smoking status: Former     Types: Cigarettes     Quit date:      Years since quittin.5    Smokeless tobacco: Never   Substance Use Topics    Alcohol use: Not Currently             2023    10:42 AM   Alcohol Use   Prescreen: >3 drinks/day or >7 drinks/week? No     Reviewed orders with patient.  Reviewed health maintenance and updated orders accordingly - Yes      Breast Cancer Screenin/29/2023    10:44 AM   Breast CA Risk Assessment (FHS-7)   Do you have a family  "history of breast, colon, or ovarian cancer? No / Unknown       click delete button to remove this line now  Patient under 40 years of age: Routine Mammogram Screening not recommended.   Pertinent mammograms are reviewed under the imaging tab.    History of abnormal Pap smear: NO - age 30-65 PAP every 5 years with negative HPV co-testing recommended     Reviewed and updated as needed this visit by clinical staff   Tobacco  Allergies  Meds  Problems  Med Hx  Surg Hx  Fam Hx          Reviewed and updated as needed this visit by Provider   Tobacco  Allergies  Meds  Problems  Med Hx  Surg Hx  Fam Hx             Review of Systems   Constitutional:  Negative for chills and fever.   HENT:  Negative for congestion, ear pain, hearing loss and sore throat.    Eyes:  Negative for pain and visual disturbance.   Respiratory:  Negative for cough and shortness of breath.    Cardiovascular:  Negative for chest pain, palpitations and peripheral edema.   Gastrointestinal:  Negative for abdominal pain, constipation, diarrhea, heartburn, hematochezia and nausea.   Breasts:  Negative for tenderness, breast mass and discharge.   Genitourinary:  Negative for dysuria, frequency, genital sores, hematuria, pelvic pain, urgency, vaginal bleeding and vaginal discharge.   Musculoskeletal:  Negative for arthralgias, joint swelling and myalgias.   Skin:  Negative for rash.   Neurological:  Negative for dizziness, weakness, headaches and paresthesias.   Psychiatric/Behavioral:  Negative for mood changes. The patient is not nervous/anxious.         OBJECTIVE:   /64   Pulse 60   Temp 98  F (36.7  C)   Resp 15   Ht 1.708 m (5' 7.25\")   Wt 93.9 kg (207 lb)   LMP 07/08/2023 (Exact Date)   SpO2 99%   Breastfeeding No   BMI 32.18 kg/m    Physical Exam  GENERAL: healthy, alert and no distress  EYES: Eyes grossly normal to inspection, PERRL and conjunctivae and sclerae normal  HENT: ear canals and TM's normal, nose and mouth " without ulcers or lesions  NECK: no adenopathy, no asymmetry, masses, or scars and thyroid normal to palpation  RESP: lungs clear to auscultation - no rales, rhonchi or wheezes  BREAST: normal without masses, tenderness or nipple discharge and no palpable axillary masses or adenopathy  CV: regular rate and rhythm, normal S1 S2, no S3 or S4, no murmur, click or rub, no peripheral edema and peripheral pulses strong  ABDOMEN: soft, nontender, no hepatosplenomegaly, no masses and bowel sounds normal   (female): deferred  MS: no gross musculoskeletal defects noted, no edema  SKIN: no suspicious lesions or rashes and significant erythema on forehead nose cheeks and chin with sparing around the eyes, inflammatory papules as well in all these areas but no significant telangiectasias.  No open comedones  NEURO: Normal strength and tone, mentation intact and speech normal  PSYCH: mentation appears normal, affect normal/bright      ASSESSMENT/PLAN:   Dona was seen today for physical.    Diagnoses and all orders for this visit:    Encounter for routine history and physical examination of adult   Routine health maintenance discussion:  No smoking, limited alcohol (7 or less servings per week), 5 fruits/veg servings per day, 200 minutes of exercise per week.  Daily calcium/vitamin D guidelines, bone health, colon cancer screening beginning at age 50.  Accident avoidance, sun screen.    Rash  -     Adult Dermatology Referral; Future  -     metroNIDAZOLE (METROCREAM) 0.75 % external cream; Apply topically 2 times daily  - I am going to have her treat this as possible rosacea.  I am going to have her use the metronidazole as listed, good emollient lotion as well.  If this is not helping I did place a referral for dermatology as she has been struggling with this now for several months.  She is overall comfortable with this.    Other orders  -     REVIEW OF HEALTH MAINTENANCE PROTOCOL ORDERS        Patient has been advised of  "split billing requirements and indicates understanding: Yes      COUNSELING:  Reviewed preventive health counseling, as reflected in patient instructions       Regular exercise       Healthy diet/nutrition       Family planning      BMI:   Estimated body mass index is 32.18 kg/m  as calculated from the following:    Height as of this encounter: 1.708 m (5' 7.25\").    Weight as of this encounter: 93.9 kg (207 lb).   Weight management plan: Discussed healthy diet and exercise guidelines      She reports that she quit smoking about 14 years ago. She has never used smokeless tobacco.          Yvonne Alegria MD  [facility]  "

## 2023-09-07 ENCOUNTER — TRANSFERRED RECORDS (OUTPATIENT)
Dept: HEALTH INFORMATION MANAGEMENT | Facility: CLINIC | Age: 38
End: 2023-09-07
Payer: COMMERCIAL

## 2023-12-11 ENCOUNTER — TRANSFERRED RECORDS (OUTPATIENT)
Dept: HEALTH INFORMATION MANAGEMENT | Facility: CLINIC | Age: 38
End: 2023-12-11
Payer: COMMERCIAL

## 2024-09-23 ENCOUNTER — PATIENT OUTREACH (OUTPATIENT)
Dept: CARE COORDINATION | Facility: CLINIC | Age: 39
End: 2024-09-23
Payer: COMMERCIAL

## 2024-10-05 ENCOUNTER — HEALTH MAINTENANCE LETTER (OUTPATIENT)
Age: 39
End: 2024-10-05

## 2024-10-17 SDOH — HEALTH STABILITY: PHYSICAL HEALTH: ON AVERAGE, HOW MANY MINUTES DO YOU ENGAGE IN EXERCISE AT THIS LEVEL?: 40 MIN

## 2024-10-17 SDOH — HEALTH STABILITY: PHYSICAL HEALTH: ON AVERAGE, HOW MANY DAYS PER WEEK DO YOU ENGAGE IN MODERATE TO STRENUOUS EXERCISE (LIKE A BRISK WALK)?: 3 DAYS

## 2024-10-17 ASSESSMENT — SOCIAL DETERMINANTS OF HEALTH (SDOH): HOW OFTEN DO YOU GET TOGETHER WITH FRIENDS OR RELATIVES?: ONCE A WEEK

## 2024-10-22 ENCOUNTER — OFFICE VISIT (OUTPATIENT)
Dept: FAMILY MEDICINE | Facility: CLINIC | Age: 39
End: 2024-10-22
Payer: COMMERCIAL

## 2024-10-22 VITALS
RESPIRATION RATE: 16 BRPM | BODY MASS INDEX: 35.3 KG/M2 | DIASTOLIC BLOOD PRESSURE: 80 MMHG | HEART RATE: 54 BPM | HEIGHT: 67 IN | OXYGEN SATURATION: 98 % | SYSTOLIC BLOOD PRESSURE: 120 MMHG | WEIGHT: 224.9 LBS | TEMPERATURE: 98.3 F

## 2024-10-22 DIAGNOSIS — Z12.4 CERVICAL CANCER SCREENING: ICD-10-CM

## 2024-10-22 DIAGNOSIS — Z00.00 ENCOUNTER FOR ROUTINE HISTORY AND PHYSICAL EXAMINATION OF ADULT: Primary | ICD-10-CM

## 2024-10-22 PROCEDURE — 99395 PREV VISIT EST AGE 18-39: CPT | Performed by: FAMILY MEDICINE

## 2024-10-22 PROCEDURE — G0145 SCR C/V CYTO,THINLAYER,RESCR: HCPCS | Performed by: FAMILY MEDICINE

## 2024-10-22 PROCEDURE — 87624 HPV HI-RISK TYP POOLED RSLT: CPT | Performed by: FAMILY MEDICINE

## 2024-10-22 ASSESSMENT — PAIN SCALES - GENERAL: PAINLEVEL: NO PAIN (0)

## 2024-10-22 NOTE — PROGRESS NOTES
"Preventive Care Visit  [facility]  Yvonne Alegria MD, Family Medicine  Oct 22, 2024      Assessment & Plan     Encounter for routine history and physical examination of adult  -Routine health maintenance discussion:  No smoking, limited alcohol (7 or less servings per week), 5 fruits/veg servings per day, 200 minutes of exercise per week.  Daily calcium/vitamin D guidelines, bone health, colon cancer screening beginning at age 50.  Accident avoidance, sun screen.     Cervical cancer screening  - HPV and Gynecologic Cytology Panel  -ASCUS pap last time, updated today again.               BMI  Estimated body mass index is 35.22 kg/m  as calculated from the following:    Height as of this encounter: 1.702 m (5' 7\").    Weight as of this encounter: 102 kg (224 lb 14.4 oz).   Weight management plan: Discussed healthy diet and exercise guidelines    Counseling  Appropriate preventive services were addressed with this patient via screening, questionnaire, or discussion as appropriate for fall prevention, nutrition, physical activity, Tobacco-use cessation, social engagement, weight loss and cognition.  Checklist reviewing preventive services available has been given to the patient.  Reviewed patient's diet, addressing concerns and/or questions.   She is at risk for lack of exercise and has been provided with information to increase physical activity for the benefit of her well-being.           Mo Carcamo is a 39 year old, presenting for the following:  Physical (Non fasting)        10/22/2024    12:37 PM   Additional Questions   Roomed by Riri [facility]        Health Care Directive  Patient does not have a Health Care Directive or Living Will: Discussed advance care planning with patient; information given to patient to review.    HPI    She is doing well. She does not have any concerns today.         10/17/2024   General Health   How would you rate your overall physical " health? Good   Feel stress (tense, anxious, or unable to sleep) Not at all            10/17/2024   Nutrition   Three or more servings of calcium each day? Yes   Diet: Regular (no restrictions)   How many servings of fruit and vegetables per day? (!) 0-1   How many sweetened beverages each day? 0-1            10/17/2024   Exercise   Days per week of moderate/strenous exercise 3 days   Average minutes spent exercising at this level 40 min            10/17/2024   Social Factors   Frequency of gathering with friends or relatives Once a week   Worry food won't last until get money to buy more No   Food not last or not have enough money for food? No   Do you have housing? (Housing is defined as stable permanent housing and does not include staying ouside in a car, in a tent, in an abandoned building, in an overnight shelter, or couch-surfing.) Yes   Are you worried about losing your housing? No   Lack of transportation? No   Unable to get utilities (heat,electricity)? No            10/17/2024   Dental   Dentist two times every year? Yes            10/17/2024   TB Screening   Were you born outside of the [place]? No            Today's PHQ-2 Score:       10/22/2024    12:36 PM   PHQ-2 ( 1999 [employer])   Q1: Little interest or pleasure in doing things 0   Q2: Feeling down, depressed or hopeless 0   PHQ-2 Score 0   Q1: Little interest or pleasure in doing things Not at all   Q2: Feeling down, depressed or hopeless Not at all   PHQ-2 Score 0           10/17/2024   Substance Use   Alcohol more than 3/day or more than 7/wk No   Do you use any other substances recreationally? No        Social History     Tobacco Use    Smoking status: Former     Current packs/day: 0.00     Types: Cigarettes     Quit date: 2009     Years since quitting: 15.8    Smokeless tobacco: Never   Substance Use Topics    Alcohol use: Not Currently    Drug use: Never          Mammogram Screening - Patient under 40 years of age: Routine Mammogram Screening not  "recommended.         10/17/2024   STI Screening   New sexual partner(s) since last STI/HIV test? No        History of abnormal Pap smear: No - age 30- 64 PAP with HPV every 5 years recommended        7/14/2021     1:28 PM   PAP / HPV   PAP-ABSTRACT See Scanned Document           This result is from an external source.           10/17/2024   Contraception/Family Planning   Questions about contraception or family planning No           Reviewed and updated as needed this visit by Provider   Tobacco  Allergies  Meds  Problems  Med Hx  Surg Hx  Fam Hx              Review of Systems  Constitutional, HEENT, cardiovascular, pulmonary, gi and gu systems are negative, except as otherwise noted.     Objective    Exam  /80 (BP Location: Right arm, Patient Position: Sitting, Cuff Size: Adult Large)   Pulse 54   Temp 98.3  F (36.8  C) (Oral)   Resp 16   Ht 1.702 m (5' 7\")   Wt 102 kg (224 lb 14.4 oz)   LMP 10/08/2024 (Approximate)   SpO2 98%   BMI 35.22 kg/m     Estimated body mass index is 35.22 kg/m  as calculated from the following:    Height as of this encounter: 1.702 m (5' 7\").    Weight as of this encounter: 102 kg (224 lb 14.4 oz).    Physical Exam  GENERAL: alert and no distress  EYES: Eyes grossly normal to inspection, PERRL and conjunctivae and sclerae normal  HENT: ear canals and TM's normal, nose and mouth without ulcers or lesions  NECK: no adenopathy, no asymmetry, masses, or scars  RESP: lungs clear to auscultation - no rales, rhonchi or wheezes  BREAST: normal without masses, tenderness or nipple discharge and no palpable axillary masses or adenopathy  CV: regular rate and rhythm, normal S1 S2, no S3 or S4, no murmur, click or rub, no peripheral edema  ABDOMEN: soft, nontender, no hepatosplenomegaly, no masses and bowel sounds normal   (female): normal female external genitalia, normal urethral meatus, normal vaginal mucosa, cervix normal  MS: no gross musculoskeletal defects noted, no " edema  SKIN: no suspicious lesions or rashes  NEURO: Normal strength and tone, mentation intact and speech normal  PSYCH: mentation appears normal, affect normal/bright        Signed Electronically by: Yvonne Alegria MD

## 2024-10-24 LAB
HPV HR 12 DNA CVX QL NAA+PROBE: NEGATIVE
HPV16 DNA CVX QL NAA+PROBE: NEGATIVE
HPV18 DNA CVX QL NAA+PROBE: NEGATIVE
HUMAN PAPILLOMA VIRUS FINAL DIAGNOSIS: NORMAL

## 2024-10-29 LAB
BKR AP ASSOCIATED HPV REPORT: NORMAL
BKR LAB AP GYN ADEQUACY: NORMAL
BKR LAB AP GYN INTERPRETATION: NORMAL
BKR LAB AP LMP: NORMAL
BKR LAB AP PREVIOUS ABNL DX: NORMAL
BKR LAB AP PREVIOUS ABNORMAL: NORMAL
PATH REPORT.COMMENTS IMP SPEC: NORMAL
PATH REPORT.COMMENTS IMP SPEC: NORMAL
PATH REPORT.RELEVANT HX SPEC: NORMAL

## 2024-10-30 PROBLEM — R87.610 ASCUS OF CERVIX WITH NEGATIVE HIGH RISK HPV: Status: RESOLVED | Noted: 2021-07-14 | Resolved: 2024-10-30
